# Patient Record
Sex: FEMALE | Race: WHITE | NOT HISPANIC OR LATINO | Employment: STUDENT | URBAN - METROPOLITAN AREA
[De-identification: names, ages, dates, MRNs, and addresses within clinical notes are randomized per-mention and may not be internally consistent; named-entity substitution may affect disease eponyms.]

---
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
## 2023-01-04 DIAGNOSIS — F41.8 DEPRESSION WITH ANXIETY: ICD-10-CM

## 2023-01-04 DIAGNOSIS — R55 VASOVAGAL SYNCOPE: ICD-10-CM

## 2023-01-04 RX ORDER — SERTRALINE HYDROCHLORIDE 25 MG/1
25 TABLET, FILM COATED ORAL DAILY
Qty: 10 TABLET | Refills: 0 | OUTPATIENT
Start: 2023-01-04

## 2023-01-06 ENCOUNTER — OFFICE VISIT (OUTPATIENT)
Dept: FAMILY MEDICINE CLINIC | Facility: CLINIC | Age: 18
End: 2023-01-06
Payer: COMMERCIAL

## 2023-01-06 VITALS
WEIGHT: 124 LBS | OXYGEN SATURATION: 100 % | HEIGHT: 61 IN | TEMPERATURE: 97.8 F | HEART RATE: 85 BPM | BODY MASS INDEX: 23.41 KG/M2

## 2023-01-06 DIAGNOSIS — E55.9 VITAMIN D DEFICIENCY: ICD-10-CM

## 2023-01-06 DIAGNOSIS — K59.00 CONSTIPATION, UNSPECIFIED CONSTIPATION TYPE: ICD-10-CM

## 2023-01-06 DIAGNOSIS — K30 ACID INDIGESTION: ICD-10-CM

## 2023-01-06 DIAGNOSIS — Z11.59 NEED FOR HEPATITIS C SCREENING TEST: ICD-10-CM

## 2023-01-06 DIAGNOSIS — Z23 INFLUENZA VACCINE ADMINISTERED: ICD-10-CM

## 2023-01-06 DIAGNOSIS — D50.9 IRON DEFICIENCY ANEMIA, UNSPECIFIED IRON DEFICIENCY ANEMIA TYPE: ICD-10-CM

## 2023-01-06 DIAGNOSIS — Z00.00 ANNUAL PHYSICAL EXAM: Primary | ICD-10-CM

## 2023-01-06 DIAGNOSIS — E53.8 FOLIC ACID DEFICIENCY: ICD-10-CM

## 2023-01-06 DIAGNOSIS — F41.8 DEPRESSION WITH ANXIETY: ICD-10-CM

## 2023-01-06 DIAGNOSIS — J30.9 ALLERGIC RHINITIS, UNSPECIFIED SEASONALITY, UNSPECIFIED TRIGGER: ICD-10-CM

## 2023-01-06 LAB
25(OH)D3 SERPL-MCNC: 55.4 NG/ML (ref 30–100)
ALBUMIN SERPL-MCNC: 4.3 G/DL (ref 3.5–5.2)
ALBUMIN/GLOB SERPL: 1.2 G/DL
ALP SERPL-CCNC: 59 U/L (ref 39–117)
ALT SERPL W P-5'-P-CCNC: 16 U/L (ref 1–33)
ANION GAP SERPL CALCULATED.3IONS-SCNC: 9.4 MMOL/L (ref 5–15)
AST SERPL-CCNC: 21 U/L (ref 1–32)
BASOPHILS # BLD AUTO: 0.02 10*3/MM3 (ref 0–0.2)
BASOPHILS NFR BLD AUTO: 0.3 % (ref 0–1.5)
BILIRUB SERPL-MCNC: 0.2 MG/DL (ref 0–1.2)
BUN SERPL-MCNC: 13 MG/DL (ref 6–20)
BUN/CREAT SERPL: 18.3 (ref 7–25)
CALCIUM SPEC-SCNC: 9.6 MG/DL (ref 8.6–10.5)
CHLORIDE SERPL-SCNC: 104 MMOL/L (ref 98–107)
CHOLEST SERPL-MCNC: 184 MG/DL (ref 0–200)
CO2 SERPL-SCNC: 26.6 MMOL/L (ref 22–29)
CREAT SERPL-MCNC: 0.71 MG/DL (ref 0.57–1)
DEPRECATED RDW RBC AUTO: 37 FL (ref 37–54)
EGFRCR SERPLBLD CKD-EPI 2021: 125.8 ML/MIN/1.73
EOSINOPHIL # BLD AUTO: 0.11 10*3/MM3 (ref 0–0.4)
EOSINOPHIL NFR BLD AUTO: 1.8 % (ref 0.3–6.2)
ERYTHROCYTE [DISTWIDTH] IN BLOOD BY AUTOMATED COUNT: 12.1 % (ref 12.3–15.4)
FOLATE SERPL-MCNC: >20 NG/ML (ref 4.78–24.2)
GLOBULIN UR ELPH-MCNC: 3.5 GM/DL
GLUCOSE SERPL-MCNC: 73 MG/DL (ref 65–99)
HCT VFR BLD AUTO: 37.9 % (ref 34–46.6)
HCV AB SER DONR QL: NORMAL
HDLC SERPL-MCNC: 72 MG/DL (ref 40–60)
HGB BLD-MCNC: 12.5 G/DL (ref 12–15.9)
IMM GRANULOCYTES # BLD AUTO: 0.03 10*3/MM3 (ref 0–0.05)
IMM GRANULOCYTES NFR BLD AUTO: 0.5 % (ref 0–0.5)
IRON 24H UR-MRATE: 40 MCG/DL (ref 37–145)
IRON SATN MFR SERPL: 8 % (ref 20–50)
LDLC SERPL CALC-MCNC: 100 MG/DL (ref 0–100)
LDLC/HDLC SERPL: 1.38 {RATIO}
LYMPHOCYTES # BLD AUTO: 1.39 10*3/MM3 (ref 0.7–3.1)
LYMPHOCYTES NFR BLD AUTO: 22.5 % (ref 19.6–45.3)
MCH RBC QN AUTO: 28.2 PG (ref 26.6–33)
MCHC RBC AUTO-ENTMCNC: 33 G/DL (ref 31.5–35.7)
MCV RBC AUTO: 85.6 FL (ref 79–97)
MONOCYTES # BLD AUTO: 0.6 10*3/MM3 (ref 0.1–0.9)
MONOCYTES NFR BLD AUTO: 9.7 % (ref 5–12)
NEUTROPHILS NFR BLD AUTO: 4.02 10*3/MM3 (ref 1.7–7)
NEUTROPHILS NFR BLD AUTO: 65.2 % (ref 42.7–76)
NRBC BLD AUTO-RTO: 0 /100 WBC (ref 0–0.2)
PLATELET # BLD AUTO: 278 10*3/MM3 (ref 140–450)
PMV BLD AUTO: 11 FL (ref 6–12)
POTASSIUM SERPL-SCNC: 4.7 MMOL/L (ref 3.5–5.2)
PROT SERPL-MCNC: 7.8 G/DL (ref 6–8.5)
RBC # BLD AUTO: 4.43 10*6/MM3 (ref 3.77–5.28)
SODIUM SERPL-SCNC: 140 MMOL/L (ref 136–145)
TIBC SERPL-MCNC: 532 MCG/DL (ref 298–536)
TRANSFERRIN SERPL-MCNC: 357 MG/DL (ref 200–360)
TRIGL SERPL-MCNC: 65 MG/DL (ref 0–150)
TSH SERPL DL<=0.05 MIU/L-ACNC: 0.83 UIU/ML (ref 0.27–4.2)
VIT B12 BLD-MCNC: >2000 PG/ML (ref 211–946)
VLDLC SERPL-MCNC: 12 MG/DL (ref 5–40)
WBC NRBC COR # BLD: 6.17 10*3/MM3 (ref 3.4–10.8)

## 2023-01-06 PROCEDURE — 82746 ASSAY OF FOLIC ACID SERUM: CPT

## 2023-01-06 PROCEDURE — 86803 HEPATITIS C AB TEST: CPT

## 2023-01-06 PROCEDURE — 82306 VITAMIN D 25 HYDROXY: CPT

## 2023-01-06 PROCEDURE — 3008F BODY MASS INDEX DOCD: CPT

## 2023-01-06 PROCEDURE — 82607 VITAMIN B-12: CPT

## 2023-01-06 PROCEDURE — 2014F MENTAL STATUS ASSESS: CPT

## 2023-01-06 PROCEDURE — 90686 IIV4 VACC NO PRSV 0.5 ML IM: CPT

## 2023-01-06 PROCEDURE — 84443 ASSAY THYROID STIM HORMONE: CPT

## 2023-01-06 PROCEDURE — 85025 COMPLETE CBC W/AUTO DIFF WBC: CPT

## 2023-01-06 PROCEDURE — 84466 ASSAY OF TRANSFERRIN: CPT

## 2023-01-06 PROCEDURE — 80061 LIPID PANEL: CPT

## 2023-01-06 PROCEDURE — 90471 IMMUNIZATION ADMIN: CPT

## 2023-01-06 PROCEDURE — 99395 PREV VISIT EST AGE 18-39: CPT

## 2023-01-06 PROCEDURE — 80053 COMPREHEN METABOLIC PANEL: CPT

## 2023-01-06 PROCEDURE — 83540 ASSAY OF IRON: CPT

## 2023-01-06 PROCEDURE — 36415 COLL VENOUS BLD VENIPUNCTURE: CPT

## 2023-01-06 RX ORDER — SERTRALINE HYDROCHLORIDE 25 MG/1
25 TABLET, FILM COATED ORAL DAILY
Qty: 90 TABLET | Refills: 1 | Status: SHIPPED | OUTPATIENT
Start: 2023-01-06

## 2023-01-06 RX ORDER — UBIDECARENONE 75 MG
50 CAPSULE ORAL DAILY
COMMUNITY

## 2023-01-06 RX ORDER — CETIRIZINE HYDROCHLORIDE 10 MG/1
10 TABLET ORAL DAILY
Qty: 90 TABLET | Refills: 1 | Status: SHIPPED | OUTPATIENT
Start: 2023-01-06

## 2023-01-06 RX ORDER — BISACODYL 5 MG
5 TABLET, DELAYED RELEASE (ENTERIC COATED) ORAL DAILY PRN
Qty: 30 TABLET | Refills: 5 | Status: SHIPPED | OUTPATIENT
Start: 2023-01-06

## 2023-01-06 RX ORDER — FAMOTIDINE 40 MG/1
40 TABLET, FILM COATED ORAL NIGHTLY PRN
Qty: 90 TABLET | Refills: 0 | Status: SHIPPED | OUTPATIENT
Start: 2023-01-06

## 2023-01-06 RX ORDER — CHOLECALCIFEROL (VITAMIN D3) 125 MCG
1 CAPSULE ORAL DAILY
Qty: 30 CAPSULE | Refills: 5 | Status: SHIPPED | OUTPATIENT
Start: 2023-01-06

## 2023-01-06 NOTE — PROGRESS NOTES
Venipuncture Blood Specimen Collection  Venipuncture performed in left arm by Alab Pruett with good hemostasis. Patient tolerated the procedure well without complications.   01/06/23   Alba Pruett

## 2023-01-06 NOTE — PROGRESS NOTES
Chief Complaint  Depression (Refills, labs, and a flu shot)    Subjective          Jenifer Yeboah presents to [facility]  History of Present Illness    Jenifer is here for refills, labs and would like to get her flu shot.     She is needing refills of her depression medication - zoloft.  She is also requesting refills of her other medication.    She denies any new concerns or issues.    She was seen in the ER a few weeks ago for abdominal pain which they said was related to constipation.  She reports she takes Colace daily to help with this.  She denies seeing a GI doctor.    Objective   Vital Signs:   Pulse 85   Temp 97.8 °F (36.6 °C)   Ht 154.9 cm (61\")   Wt 56.2 kg (124 lb)   SpO2 100%   BMI 23.43 kg/m²     Physical Exam  Vitals reviewed.   Constitutional:       Appearance: Normal appearance. She is well-developed.   HENT:      Head: Normocephalic and atraumatic.      Mouth/Throat:      Pharynx: No oropharyngeal exudate.   Eyes:      Conjunctiva/sclera: Conjunctivae normal.      Pupils: Pupils are equal, round, and reactive to light.   Cardiovascular:      Rate and Rhythm: Normal rate and regular rhythm.      Heart sounds: No murmur heard.    No friction rub. No gallop.   Pulmonary:      Effort: Pulmonary effort is normal.      Breath sounds: Normal breath sounds. No wheezing or rhonchi.   Skin:     General: Skin is warm and dry.   Neurological:      Mental Status: She is alert and oriented to person, place, and time.   Psychiatric:         Mood and Affect: Affect normal.        Result Review :     CMP    CMP 12/16/22   Glucose 85   BUN 12   Creatinine 0.78   eGFR 112.4   Sodium 140   Potassium 3.8   Chloride 101   Calcium 9.5   Total Protein 7.3   Albumin 4.30   Globulin 3.0   Total Bilirubin <0.2   Alkaline Phosphatase 63   AST (SGOT) 16   ALT (SGPT) 13   Albumin/Globulin Ratio 1.4   BUN/Creatinine Ratio 15.4   Anion Gap 10.8      Comments are available for some flowsheets  but are not being displayed.           CBC    CBC 12/16/22   WBC 6.67   RBC 4.33   Hemoglobin 12.2   Hematocrit 36.2   MCV 83.6   MCH 28.2   MCHC 33.7   RDW 12.1 (A)   Platelets 307   (A) Abnormal value                           Procedures      Assessment and Plan    Diagnoses and all orders for this visit:    1. Annual physical exam (Primary)  Assessment & Plan:  Screening labs reviewed/ordered. Counseling provided regarding age appropriate screenings and immunizations, healthy diet and exercise.     Orders:  -     Comprehensive Metabolic Panel  -     CBC & Differential  -     TSH  -     Lipid Panel    2. Influenza vaccine administered  Comments:  Tolerated well.  Orders:  -     FluLaval/Fluzone >6 mos ([id number])    3. Depression with anxiety  Comments:  Patient doing well on current dose of Zoloft.  Denies SI/HI.  Denies any concerns.  Refill sent in.  Orders:  -     sertraline (ZOLOFT) 25 MG tablet; Take 1 tablet by mouth Daily.  Dispense: 90 tablet; Refill: 1    4. Vitamin D deficiency  Comments:  We will check labs and refill medication as needed based on results.  Orders:  -     Vitamin D 25 hydroxy    5. Iron deficiency anemia, unspecified iron deficiency anemia type  Comments:  History of DAYANA.  Patient not currently on iron supplementation.  Orders:  -     Iron Profile    6. Folic acid deficiency  Comments:  Will check folate levels.  Refill will be sent in based on results.  Orders:  -     Vitamin B12 & Folate    7. Allergic rhinitis, unspecified seasonality, unspecified trigger  Comments:  Refill sent in of daily allergy medicine.  Patient reports symptoms are controlled with medication.  Orders:  -     cetirizine (zyrTEC) 10 MG tablet; Take 1 tablet by mouth Daily.  Dispense: 90 tablet; Refill: 1    8. Acid indigestion  Comments:  Patient reports symptoms are controlled with currently prescribed Pepcid.  Refill sent in per patient request.  Orders:  -     famotidine (PEPCID) 40 MG tablet; Take 1 tablet  by mouth At Night As Needed for Heartburn.  Dispense: 90 tablet; Refill: 0    9. Constipation, unspecified constipation type  Comments:  Patient takes Colace intermittently to help with constipation.  Discussed possible GI referral if symptoms persist.  Patient verbalized understanding.  Orders:  -     Lactobacillus (Probiotic Acidophilus) capsule; Take 1 each by mouth Daily.  Dispense: 30 capsule; Refill: 5  -     bisacodyl (Dulcolax) 5 MG EC tablet; Take 1 tablet by mouth Daily As Needed for Constipation.  Dispense: 30 tablet; Refill: 5    10. Need for hepatitis C screening test  -     Hepatitis C Antibody    Patient was instructed and educated on their diagnoses and treatment plan prior to leaving the office. If symptoms worsen or persist, seek emergency medical attention. Take all medications as prescribed. Call the office if any questions or concerns arise.       19 to 39: Counseling/Anticipatory Guidance Discussed: nutrition, physical activity, injury prevention, misuse of tobacco, sexual behavior and STDs, dental health, mental health, immunizations and breast cancer and self breast exams      Follow Up   Return if symptoms worsen or fail to improve.  Patient was given instructions and counseling regarding her condition or for health maintenance advice. Please see specific information pulled into the AVS if appropriate.

## 2023-01-06 NOTE — ASSESSMENT & PLAN NOTE
Screening labs reviewed/ordered. Counseling provided regarding age appropriate screenings and immunizations, healthy diet and exercise.

## 2023-01-18 DIAGNOSIS — E53.8 FOLIC ACID DEFICIENCY: ICD-10-CM

## 2023-01-18 RX ORDER — FOLIC ACID 1 MG/1
1 TABLET ORAL DAILY
Qty: 30 TABLET | Refills: 0 | Status: SHIPPED | OUTPATIENT
Start: 2023-01-18 | End: 2023-03-30

## 2023-02-17 DIAGNOSIS — E53.8 FOLIC ACID DEFICIENCY: ICD-10-CM

## 2023-02-17 RX ORDER — FOLIC ACID 1 MG/1
1 TABLET ORAL DAILY
Qty: 30 TABLET | Refills: 0 | OUTPATIENT
Start: 2023-02-17

## 2023-03-14 DIAGNOSIS — E55.9 VITAMIN D DEFICIENCY: ICD-10-CM

## 2023-03-15 RX ORDER — CHOLECALCIFEROL (VITAMIN D3) 125 MCG
CAPSULE ORAL
Qty: 30 TABLET | Refills: 2 | Status: SHIPPED | OUTPATIENT
Start: 2023-03-15

## 2023-03-19 DIAGNOSIS — K59.00 CONSTIPATION, UNSPECIFIED CONSTIPATION TYPE: ICD-10-CM

## 2023-03-20 RX ORDER — DOCUSATE SODIUM 100 MG/1
CAPSULE, LIQUID FILLED ORAL
Qty: 30 CAPSULE | Refills: 5 | OUTPATIENT
Start: 2023-03-20

## 2023-03-21 ENCOUNTER — TELEPHONE (OUTPATIENT)
Dept: FAMILY MEDICINE CLINIC | Facility: CLINIC | Age: 18
End: 2023-03-21

## 2023-03-21 NOTE — TELEPHONE ENCOUNTER
Caller: DB TEJEDA  VERBAL ON FILE    Relationship: Emergency Contact    Best call back number: [phone]    Who are you requesting to speak with (clinical staff, provider,  specific staff member): MEDICAL STAFF    What was the call regarding: PATIENTS GRANDFATHER WAS TOLD BY PHARMACY THAT THE DOCUSATE REFILL REQUEST WAS DENIED. HE WOULD LIKE TO KNOW WHY THE REFILL IS DENIED SINCE SHE HAS A SCHEDULED 6 MONTH FOLLOW UP APPOINTMENT. SHE HAS ABOUT 2 WEEKS WORTH LEFT. PLEASE CALL TO ADVISE.   Speech Therapy Daily Treatment    Visit Count: 2/20  Plan of Care Dates: Initial: 5/31/2018 Through: 9/6/2018  Insurance Information: THERAPY BENEFITS:  PAYOR: HUMANA  VISIT LIMIT: 60 VISITS PER YEAR - COMBINED PT/OT/ST  AUTHORIZATION NEEDED: NO        DEDUCTIBLE: $400.00               MET: $YES  OUT OF POCKET: $5000.00      MET: $479.15  COINSURANCE: 0  COPAY: $35.00 PER DAY  REF #: ONLINE  THIS QUOTE OF BENEFITS IS NOT A GUARANTEE OF PAYMENT     Next Referring Provider Visit: na     Referred by: Dr. Chester Draper MD  Medical Diagnosis (from order  Acute ischemic left MCA stroke (CMS/[facility]) [I63.512]       Aphasia [R47.01]             Treatment Diagnosis: Cognitive Communication Deficit  Aphasia  Disorder of Written Expression  Expressive Language Disorder  Aphasia Following Cerebral Infarction  Cognitive Deficits Following Cerebral Infarction  Dysarthria Following Cerebral Infarction  Insurance: 1. HUMANA  2. N/A     Date of Onset/Injury: 4/26/18 hospitalized  Precautions: Aphasia  Relevant History/Medications: See Neuro Rehabilitation Intake Form  Relevant Tests: CT scan     Medical/surgical history, medications and relevant tests have been reviewed.         SUBJECTIVE:    Pt reports having difficulty reading numbers and remembering numbers (ie address and phone number)  Pain: patient reports pain is not an issue/concern    OBJECTIVE:        Treatment   Auditory Comprehension:    Verbal Expression:Skilled instruction in word finding strategies.  Provided For Your WellBeing Sheet.  Skilled instruction and practice in strategy of \"more or less in degree\".  Pt able to demonstrate in the clinic. Home practice provided.  Speed drill--name object described within 5 seconds, 16/20, 80%.    Reading Comprehension: Sequencing Informational Statements exercise--reading 2 sentence paragraph and sequencing 3 events, 3/5 correct, 60% accuracy. Instruction in strategies to reading aloud and underlining important information.   Home practice provided.    Graphic Expression: Writing 3 steps for a given activity.  Pt encouraged to verbalize step first, then write.  Generally would use 2-3 word phrase--Min cues needed to expand information.  Micrographia noted--pt provided with lined paper--improved legibility with instruction to hit top and bottom lines.    Executive Function:    Numerical Reasoning:    Motor Speech: Skilled instruction in articulation exercises (pa; la; ka; willy; and buttercup)--skilled instruction in  lingual movement from jaw.  Home practice provided.    Current Home Program (not performed this date except as noted above):  Exercises: Aphasia Exercises    ASSESSMENT:    61 year old female presented initially to therapy with significant decline in prior level of function and is below functional baseline due to mild-moderate expressive language deficits, as well as reading/writing deficts,   following Cerebrovascular Accident (CVA).        Today's Treatment: Skilled instruction, facilitation/stimulation exercises and compensatory strategies were focus of session for verbal expression--word finding strategies.  Reading comprehension--skilled instruction/practice in comprehension strategies to highlight important information.  Graphic expression requiring cues to increase description/sentence length.      Result of above outlined education: Verbalizes understanding    Goals:   To be obtained by end of this plan of care:  1. Patient will comprehend complex auditory stimuli for IADL's at 90% accuracy to maintain safety and participate socially in functional living environment.  2. Patient will demonstrate complex language/thought processing skills such as problem solving, reasoning, and higher level executive functioning in order to improve safety and awareness in functional living environment at 80% accuracy with min cues.  3. Patient will improve numerical reasoning and financial management at independent level at  90% accuracy with min cues.  4. Patient will develop functional reading skills at multi-paragraph level and utilize compensatory strategies to maintain safety during IADL's and read and understand functional material at 90% accuracy with min cues.  5. Patient will develop verbal self expression and utilize compensatory strategies to communicate wants and needs effectively to different conversational partners, maintain safety, and participate socially in functional living environment at 80% accuracy with min cues.  6. Patient will improve functional graphic language skills to communicate daily needs/activities at 90%.  7. Pt will demonstrate improved speech intelligibility to 95% for 5 minute conversation using dysarthria reduction techniques and improved coordination/strength of oral articulators,.       PLAN:    Next session: check homework; Complex auditory comprehension; verbal expression--word finding(synonyms; speed drills); paragraph reading, picture;  descrpition (verbal and writing)--sequences; numeric reasoning; problem solving; motor speech (check progress on articulation exercises)    THERAPY DAILY BILLING:    Primary Insurance: HUMANA  Secondary Insurance: N/A    Evaluation Procedures:  No evaluation codes were used on this date of service    Treatment Procedures:  Treatment of Speech Language    Total Treatment Time: 55 minutes      The referring provider's electronic or written signature on the evaluation authorizes the therapy plan of care and certifies the need for these services, furnished under this plan of care while under their care.  Physician Signature on file.

## 2023-03-29 DIAGNOSIS — E53.8 FOLIC ACID DEFICIENCY: ICD-10-CM

## 2023-03-29 DIAGNOSIS — K59.00 CONSTIPATION, UNSPECIFIED CONSTIPATION TYPE: ICD-10-CM

## 2023-03-30 RX ORDER — FOLIC ACID 1 MG/1
1 TABLET ORAL DAILY
Qty: 30 TABLET | Refills: 0 | Status: SHIPPED | OUTPATIENT
Start: 2023-03-30

## 2023-03-30 RX ORDER — DOCUSATE SODIUM 100 MG/1
CAPSULE, LIQUID FILLED ORAL
Qty: 30 CAPSULE | Refills: 0 | Status: SHIPPED | OUTPATIENT
Start: 2023-03-30

## 2023-04-27 DIAGNOSIS — E53.8 FOLIC ACID DEFICIENCY: ICD-10-CM

## 2023-04-28 DIAGNOSIS — K59.00 CONSTIPATION, UNSPECIFIED CONSTIPATION TYPE: ICD-10-CM

## 2023-04-28 RX ORDER — DOCUSATE SODIUM 100 MG/1
CAPSULE, LIQUID FILLED ORAL
Qty: 30 CAPSULE | Refills: 0 | Status: SHIPPED | OUTPATIENT
Start: 2023-04-28

## 2023-04-28 RX ORDER — FOLIC ACID 1 MG/1
1 TABLET ORAL DAILY
Qty: 30 TABLET | Refills: 0 | Status: SHIPPED | OUTPATIENT
Start: 2023-04-28

## 2023-05-23 ENCOUNTER — OFFICE VISIT (OUTPATIENT)
Dept: FAMILY MEDICINE CLINIC | Facility: CLINIC | Age: 18
End: 2023-05-23
Payer: COMMERCIAL

## 2023-05-23 VITALS
WEIGHT: 122.6 LBS | TEMPERATURE: 97.6 F | HEIGHT: 62 IN | HEART RATE: 130 BPM | SYSTOLIC BLOOD PRESSURE: 110 MMHG | OXYGEN SATURATION: 97 % | DIASTOLIC BLOOD PRESSURE: 60 MMHG | BODY MASS INDEX: 22.56 KG/M2

## 2023-05-23 DIAGNOSIS — R11.2 NAUSEA AND VOMITING, UNSPECIFIED VOMITING TYPE: ICD-10-CM

## 2023-05-23 DIAGNOSIS — Z72.0 TOBACCO ABUSE: ICD-10-CM

## 2023-05-23 DIAGNOSIS — F41.9 ANXIETY: Primary | ICD-10-CM

## 2023-05-23 DIAGNOSIS — F41.8 DEPRESSION WITH ANXIETY: ICD-10-CM

## 2023-05-23 LAB
ALBUMIN SERPL-MCNC: 4.4 G/DL (ref 3.5–5.2)
ALBUMIN/GLOB SERPL: 1.3 G/DL
ALP SERPL-CCNC: 65 U/L (ref 39–117)
ALT SERPL W P-5'-P-CCNC: 12 U/L (ref 1–33)
ANION GAP SERPL CALCULATED.3IONS-SCNC: 18.7 MMOL/L (ref 5–15)
AST SERPL-CCNC: 19 U/L (ref 1–32)
BASOPHILS # BLD AUTO: 0.02 10*3/MM3 (ref 0–0.2)
BASOPHILS NFR BLD AUTO: 0.2 % (ref 0–1.5)
BILIRUB SERPL-MCNC: 0.3 MG/DL (ref 0–1.2)
BUN SERPL-MCNC: 12 MG/DL (ref 6–20)
BUN/CREAT SERPL: 16 (ref 7–25)
CALCIUM SPEC-SCNC: 9.8 MG/DL (ref 8.6–10.5)
CHLORIDE SERPL-SCNC: 101 MMOL/L (ref 98–107)
CO2 SERPL-SCNC: 20.3 MMOL/L (ref 22–29)
CREAT SERPL-MCNC: 0.75 MG/DL (ref 0.57–1)
DEPRECATED RDW RBC AUTO: 39 FL (ref 37–54)
EGFRCR SERPLBLD CKD-EPI 2021: 117.1 ML/MIN/1.73
EOSINOPHIL # BLD AUTO: 0 10*3/MM3 (ref 0–0.4)
EOSINOPHIL NFR BLD AUTO: 0 % (ref 0.3–6.2)
ERYTHROCYTE [DISTWIDTH] IN BLOOD BY AUTOMATED COUNT: 13.5 % (ref 12.3–15.4)
GLOBULIN UR ELPH-MCNC: 3.5 GM/DL
GLUCOSE SERPL-MCNC: 63 MG/DL (ref 65–99)
HCT VFR BLD AUTO: 39.4 % (ref 34–46.6)
HGB BLD-MCNC: 12.8 G/DL (ref 12–15.9)
IMM GRANULOCYTES # BLD AUTO: 0.04 10*3/MM3 (ref 0–0.05)
IMM GRANULOCYTES NFR BLD AUTO: 0.4 % (ref 0–0.5)
LYMPHOCYTES # BLD AUTO: 0.68 10*3/MM3 (ref 0.7–3.1)
LYMPHOCYTES NFR BLD AUTO: 6.7 % (ref 19.6–45.3)
MAGNESIUM SERPL-MCNC: 2 MG/DL (ref 1.7–2.2)
MCH RBC QN AUTO: 26.3 PG (ref 26.6–33)
MCHC RBC AUTO-ENTMCNC: 32.5 G/DL (ref 31.5–35.7)
MCV RBC AUTO: 80.9 FL (ref 79–97)
MONOCYTES # BLD AUTO: 0.39 10*3/MM3 (ref 0.1–0.9)
MONOCYTES NFR BLD AUTO: 3.9 % (ref 5–12)
NEUTROPHILS NFR BLD AUTO: 8.97 10*3/MM3 (ref 1.7–7)
NEUTROPHILS NFR BLD AUTO: 88.8 % (ref 42.7–76)
NRBC BLD AUTO-RTO: 0 /100 WBC (ref 0–0.2)
PLATELET # BLD AUTO: 308 10*3/MM3 (ref 140–450)
PMV BLD AUTO: 11.4 FL (ref 6–12)
POTASSIUM SERPL-SCNC: 3.8 MMOL/L (ref 3.5–5.2)
PROT SERPL-MCNC: 7.9 G/DL (ref 6–8.5)
RBC # BLD AUTO: 4.87 10*6/MM3 (ref 3.77–5.28)
SODIUM SERPL-SCNC: 140 MMOL/L (ref 136–145)
T4 FREE SERPL-MCNC: 1.7 NG/DL (ref 0.93–1.7)
TSH SERPL DL<=0.05 MIU/L-ACNC: 0.31 UIU/ML (ref 0.27–4.2)
WBC NRBC COR # BLD: 10.1 10*3/MM3 (ref 3.4–10.8)

## 2023-05-23 PROCEDURE — 84439 ASSAY OF FREE THYROXINE: CPT | Performed by: FAMILY MEDICINE

## 2023-05-23 PROCEDURE — 36415 COLL VENOUS BLD VENIPUNCTURE: CPT | Performed by: FAMILY MEDICINE

## 2023-05-23 PROCEDURE — 84443 ASSAY THYROID STIM HORMONE: CPT | Performed by: FAMILY MEDICINE

## 2023-05-23 PROCEDURE — 85025 COMPLETE CBC W/AUTO DIFF WBC: CPT | Performed by: FAMILY MEDICINE

## 2023-05-23 PROCEDURE — 1159F MED LIST DOCD IN RCRD: CPT | Performed by: FAMILY MEDICINE

## 2023-05-23 PROCEDURE — 99214 OFFICE O/P EST MOD 30 MIN: CPT | Performed by: FAMILY MEDICINE

## 2023-05-23 PROCEDURE — 1160F RVW MEDS BY RX/DR IN RCRD: CPT | Performed by: FAMILY MEDICINE

## 2023-05-23 PROCEDURE — 83735 ASSAY OF MAGNESIUM: CPT | Performed by: FAMILY MEDICINE

## 2023-05-23 PROCEDURE — 80053 COMPREHEN METABOLIC PANEL: CPT | Performed by: FAMILY MEDICINE

## 2023-05-23 RX ORDER — BUSPIRONE HYDROCHLORIDE 5 MG/1
5 TABLET ORAL 3 TIMES DAILY
Qty: 90 TABLET | Refills: 1 | Status: SHIPPED | OUTPATIENT
Start: 2023-05-23

## 2023-05-23 RX ORDER — ONDANSETRON 4 MG/1
4 TABLET, FILM COATED ORAL 4 TIMES DAILY PRN
Qty: 30 TABLET | Refills: 1 | Status: SHIPPED | OUTPATIENT
Start: 2023-05-23

## 2023-05-23 RX ORDER — NICOTINE 21 MG/24HR
1 PATCH, TRANSDERMAL 24 HOURS TRANSDERMAL EVERY 24 HOURS
Qty: 30 PATCH | Refills: 0 | Status: SHIPPED | OUTPATIENT
Start: 2023-05-23

## 2023-05-23 NOTE — PROGRESS NOTES
Venipuncture Blood Specimen Collection  Venipuncture performed in left arm by Alba Pruett with good hemostasis. Patient tolerated the procedure well without complications.   05/23/23   Alba Pruett

## 2023-05-23 NOTE — PROGRESS NOTES
Chief Complaint  Vomiting and Anxiety (Anxiety and emesis x 8 days since quitting e ciggerates/vaping )    Subjective          Jenifer Yeboah presents to [facility]  History of Present Illness  Pt has been vaping x 3 yrs- pt quit last week and has had anxiety, nausea and vomiting since then- pt is on zoloft 25mg for anxiety  Pt says no chance of pregnancy  Pt has no SI or HI- pt feels safe  Anxiety  Presents for follow-up visit. Symptoms include depressed mood, excessive worry, insomnia, irritability, nausea, nervous/anxious behavior and panic. Patient reports no chest pain, compulsions, confusion, decreased concentration, dizziness, dry mouth, feeling of choking, hyperventilation, malaise, muscle tension, obsessions, palpitations, restlessness, shortness of breath or suicidal ideas. Symptoms occur constantly. The severity of symptoms is severe. The quality of sleep is fair. Nighttime awakenings: occasional.     Compliance with medications is %. Treatment side effects: no side effects.       Objective   Allergies   Allergen Reactions   • Sulfamethoxazole-Trimethoprim Hives   • Naproxen Confusion     Started studerring     Immunization History   Administered Date(s) Administered   • COVID-19 (MODERNA) 1st,2nd,3rd Dose Monovalent 08/06/2021, 08/31/2021   • DTaP 2003, 2003, 2003, 01/04/2005, 04/04/2007   • DTaP, Unspecified 2003, 2003, 2003, 01/04/2005, 04/04/2007   • Flu Vaccine Quad PF >36MO 10/18/2018, 10/15/2019, 10/19/2020   • Flu Vaccine Split Quad 10/18/2018, 10/15/2019, 10/19/2020   • FluLaval/Fluzone >6mos 10/20/2021, 01/06/2023   • FluMist 2-49yrs 11/18/2014, 10/06/2015   • HPV Quadrivalent 08/04/2015   • Hep A, 2 Dose 01/29/2014, 08/04/2015   • Hep B, Adolescent or Pediatric 2003, 2003, 2003   • Hep B, Unspecified 2003, 2003, 2003   • HiB 2003, 2003, 2003, 01/04/2005   • Hib  (PRP-OMP) 2003, 2003, 2003, 01/04/2005   • Hpv9 10/06/2015, 04/06/2016   • IPV 2003, 2003, 2003, 04/04/2007   • Influenza LAIV (Nasal) 10/09/2009, 01/14/2011, 10/10/2011, 12/10/2012, 10/12/2013   • MMR 02/23/2004, 04/04/2007   • Meningococcal MCV4P (Menactra) 01/29/2014, 01/29/2014, 02/19/2019   • PEDS-Pneumococcal Conjugate (PCV7) 2003, 2003, 2003, 01/04/2005   • Pneumococcal, Unspecified 2003, 2003, 2003, 01/04/2005   • Polio, Unspecified 2003, 2003, 2003, 04/04/2007   • Tdap 01/29/2014   • Varicella 02/23/2004, 04/04/2007     Past Medical History:   Diagnosis Date   • Allergic    • Anxiety    • Asthma    • Depression    • GERD (gastroesophageal reflux disease)       History reviewed. No pertinent surgical history.   Social History     Socioeconomic History   • Marital status: Single   Tobacco Use   • Smoking status: Never   • Smokeless tobacco: Never   Vaping Use   • Vaping Use: Former   • Substances: Nicotine, Flavoring   • Devices: Disposable   Substance and Sexual Activity   • Alcohol use: Never   • Drug use: Never   • Sexual activity: Defer     Birth control/protection: None        Current Outpatient Medications:   •  APAP-Pamabrom-Pyrilamine (PAMPRIN MULTI-SYMPTOM PO), Take  by mouth., Disp: , Rfl:   •  bisacodyl (Dulcolax) 5 MG EC tablet, Take 1 tablet by mouth Daily As Needed for Constipation., Disp: 30 tablet, Rfl: 5  •  cetirizine (zyrTEC) 10 MG tablet, Take 1 tablet by mouth Daily., Disp: 90 tablet, Rfl: 1  •  Cholecalciferol (Vitamin D3) 50 MCG (2000 UT) tablet, TAKE 1 TABLET BY MOUTH DAILY, Disp: 30 tablet, Rfl: 2  •  docusate sodium (COLACE) 100 MG capsule, TAKE 1 CAPSULE BY MOUTH EVERY DAY, Disp: 30 capsule, Rfl: 0  •  famotidine (PEPCID) 40 MG tablet, Take 1 tablet by mouth At Night As Needed for Heartburn., Disp: 90 tablet, Rfl: 0  •  folic acid (FOLVITE) 1 MG tablet, TAKE 1 TABLET BY MOUTH DAILY, Disp: 30  "tablet, Rfl: 0  •  Multiple Vitamins-Minerals ([facility] DAILY GUMMIES PO), Take  by mouth. Gummy b 12, Disp: , Rfl:   •  sertraline (ZOLOFT) 50 MG tablet, Take 1 tablet by mouth Daily., Disp: 30 tablet, Rfl: 1  •  vitamin B-12 (CYANOCOBALAMIN) 100 MCG tablet, Take 50 mcg by mouth Daily., Disp: , Rfl:   •  busPIRone (BUSPAR) 5 MG tablet, Take 1 tablet by mouth 3 (Three) Times a Day., Disp: 90 tablet, Rfl: 1  •  nicotine (Nicoderm CQ) 14 MG/24HR patch, Place 1 patch on the skin as directed by provider Daily., Disp: 30 patch, Rfl: 0  •  ondansetron (Zofran) 4 MG tablet, Take 1 tablet by mouth 4 (Four) Times a Day As Needed for Nausea or Vomiting., Disp: 30 tablet, Rfl: 1   Family History   Problem Relation Age of Onset   • No Known Problems Father    • No Known Problems Mother    • Heart disease Sister    • Ovarian cancer Maternal Grandmother           Vital Signs:   Vitals:    05/23/23 0854   BP: 110/60   BP Location: Right arm   Patient Position: Sitting   Cuff Size: Adult   Pulse: (!) 130   Temp: 97.6 °F (36.4 °C)   SpO2: 97%   Weight: 55.6 kg (122 lb 9.6 oz)   Height: 157.5 cm (62\")       Review of Systems   Constitutional: Positive for irritability.   Respiratory: Negative for shortness of breath.    Cardiovascular: Negative for chest pain and palpitations.   Gastrointestinal: Positive for nausea.   Neurological: Negative for dizziness.   Psychiatric/Behavioral: Negative for confusion, decreased concentration and suicidal ideas. The patient is nervous/anxious and has insomnia.       Physical Exam  Vitals reviewed.   Constitutional:       Appearance: Normal appearance. She is well-developed.   HENT:      Head: Normocephalic and atraumatic.      Right Ear: External ear normal.      Left Ear: External ear normal.      Mouth/Throat:      Pharynx: No oropharyngeal exudate.   Eyes:      Conjunctiva/sclera: Conjunctivae normal.      Pupils: Pupils are equal, round, and reactive to light.   Cardiovascular:      Rate and " Rhythm: Normal rate and regular rhythm.      Pulses: Normal pulses.      Heart sounds: Normal heart sounds. No murmur heard.    No friction rub. No gallop.   Pulmonary:      Effort: Pulmonary effort is normal.      Breath sounds: Normal breath sounds. No wheezing or rhonchi.   Abdominal:      General: Abdomen is flat. Bowel sounds are normal. There is no distension.      Palpations: Abdomen is soft. There is no mass.      Tenderness: There is no abdominal tenderness. There is no guarding or rebound.      Hernia: No hernia is present.   Musculoskeletal:         General: Normal range of motion.   Skin:     General: Skin is warm and dry.      Capillary Refill: Capillary refill takes less than 2 seconds.   Neurological:      General: No focal deficit present.      Mental Status: She is alert and oriented to person, place, and time.      Cranial Nerves: No cranial nerve deficit.   Psychiatric:         Mood and Affect: Mood and affect normal.         Behavior: Behavior normal.         Thought Content: Thought content normal.         Judgment: Judgment normal.        Result Review :                 Assessment and Plan    Diagnoses and all orders for this visit:    1. Anxiety (Primary)  -     busPIRone (BUSPAR) 5 MG tablet; Take 1 tablet by mouth 3 (Three) Times a Day.  Dispense: 90 tablet; Refill: 1    2. Depression with anxiety  Comments:  Patient doing well on current dose of Zoloft.  Denies SI/HI.  Denies any concerns.  Refill sent in.  Orders:  -     sertraline (ZOLOFT) 50 MG tablet; Take 1 tablet by mouth Daily.  Dispense: 30 tablet; Refill: 1  -     TSH+Free T4    3. Tobacco abuse  -     nicotine (Nicoderm CQ) 14 MG/24HR patch; Place 1 patch on the skin as directed by provider Daily.  Dispense: 30 patch; Refill: 0    4. Nausea and vomiting, unspecified vomiting type  -     ondansetron (Zofran) 4 MG tablet; Take 1 tablet by mouth 4 (Four) Times a Day As Needed for Nausea or Vomiting.  Dispense: 30 tablet; Refill: 1  -      CBC Auto Differential  -     Comprehensive Metabolic Panel  -     Magnesium            Follow Up   Return in about 2 weeks (around 6/6/2023) for Recheck.  Patient was given instructions and counseling regarding her condition or for health maintenance advice. Please see specific information pulled into the AVS if appropriate.

## 2023-05-28 DIAGNOSIS — E53.8 FOLIC ACID DEFICIENCY: ICD-10-CM

## 2023-05-28 DIAGNOSIS — K59.00 CONSTIPATION, UNSPECIFIED CONSTIPATION TYPE: ICD-10-CM

## 2023-05-30 RX ORDER — FOLIC ACID 1 MG/1
1 TABLET ORAL DAILY
Qty: 30 TABLET | Refills: 0 | Status: SHIPPED | OUTPATIENT
Start: 2023-05-30

## 2023-05-30 RX ORDER — DOCUSATE SODIUM 100 MG/1
CAPSULE, LIQUID FILLED ORAL
Qty: 30 CAPSULE | Refills: 0 | Status: SHIPPED | OUTPATIENT
Start: 2023-05-30

## 2023-06-09 ENCOUNTER — OFFICE VISIT (OUTPATIENT)
Dept: FAMILY MEDICINE CLINIC | Facility: CLINIC | Age: 18
End: 2023-06-09
Payer: COMMERCIAL

## 2023-06-09 VITALS
OXYGEN SATURATION: 98 % | SYSTOLIC BLOOD PRESSURE: 122 MMHG | DIASTOLIC BLOOD PRESSURE: 80 MMHG | WEIGHT: 123 LBS | HEIGHT: 62 IN | BODY MASS INDEX: 22.63 KG/M2 | TEMPERATURE: 97.7 F | HEART RATE: 95 BPM

## 2023-06-09 DIAGNOSIS — F41.9 ANXIETY: ICD-10-CM

## 2023-06-09 DIAGNOSIS — F41.8 DEPRESSION WITH ANXIETY: ICD-10-CM

## 2023-06-09 PROCEDURE — 1160F RVW MEDS BY RX/DR IN RCRD: CPT | Performed by: FAMILY MEDICINE

## 2023-06-09 PROCEDURE — 1159F MED LIST DOCD IN RCRD: CPT | Performed by: FAMILY MEDICINE

## 2023-06-09 PROCEDURE — 99213 OFFICE O/P EST LOW 20 MIN: CPT | Performed by: FAMILY MEDICINE

## 2023-06-09 RX ORDER — BUSPIRONE HYDROCHLORIDE 5 MG/1
5 TABLET ORAL 3 TIMES DAILY
Qty: 270 TABLET | Refills: 1 | Status: SHIPPED | OUTPATIENT
Start: 2023-06-09

## 2023-06-09 NOTE — PROGRESS NOTES
Chief Complaint  Anxiety (F/u on last visit and nicotine patches )    Subjective          Jenifer Yeboah presents to [facility]  History of Present Illness  Patches working to quit vaping- pt has quit vaping    Anxiety  Presents for follow-up visit. Patient reports no chest pain, compulsions, confusion, decreased concentration, depressed mood, dizziness, dry mouth, excessive worry, feeling of choking, hyperventilation, insomnia, irritability, malaise, muscle tension, nausea, nervous/anxious behavior, obsessions, palpitations, panic, restlessness, shortness of breath or suicidal ideas. Symptoms occur occasionally. The severity of symptoms is moderate. The quality of sleep is good. Nighttime awakenings: none.     Compliance with medications is %. Treatment side effects: no side effects.     Objective   Allergies   Allergen Reactions    Sulfamethoxazole-Trimethoprim Hives    Naproxen Confusion     Started studerring     Immunization History   Administered Date(s) Administered    COVID-19 (MODERNA) 1st,2nd,3rd Dose Monovalent 08/06/2021, 08/31/2021    DTaP 2003, 2003, 2003, 01/04/2005, 04/04/2007    DTaP, Unspecified 2003, 2003, 2003, 01/04/2005, 04/04/2007    Flu Vaccine Quad PF >36MO 10/18/2018, 10/15/2019, 10/19/2020    Flu Vaccine Split Quad 10/18/2018, 10/15/2019, 10/19/2020    FluLaval/Fluzone >6mos 10/20/2021, 01/06/2023    FluMist 2-49yrs 11/18/2014, 10/06/2015    HPV Quadrivalent 08/04/2015    Hep A, 2 Dose 01/29/2014, 08/04/2015    Hep B, Adolescent or Pediatric 2003, 2003, 2003    Hep B, Unspecified 2003, 2003, 2003    HiB 2003, 2003, 2003, 01/04/2005    Hib (PRP-OMP) 2003, 2003, 2003, 01/04/2005    Hpv9 10/06/2015, 04/06/2016    IPV 2003, 2003, 2003, 04/04/2007    Influenza LAIV (Nasal) 10/09/2009, 01/14/2011, 10/10/2011, 12/10/2012, 10/12/2013     MMR 2004, 2007    Meningococcal MCV4P (Menactra) 2014, 2014, 2019    PEDS-Pneumococcal Conjugate (PCV7) 2003, 2003, 2003, 2005    Pneumococcal, Unspecified 2003, 2003, 2003, 2005    Polio, Unspecified 2003, 2003, 2003, 2007    Tdap 2014    Varicella 2004, 2007     Past Medical History:   Diagnosis Date    Allergic     Anxiety     Asthma     Depression     GERD (gastroesophageal reflux disease)       History reviewed. No pertinent surgical history.   Social History     Socioeconomic History    Marital status: Single   Tobacco Use    Smoking status: Former     Packs/day: 1.00     Years: 3.00     Pack years: 3.00     Types: Cigarettes     Start date: 2020     Quit date: 2023     Years since quittin.0    Smokeless tobacco: Never   Vaping Use    Vaping Use: Former    Substances: Nicotine, Flavoring    Devices: Disposable   Substance and Sexual Activity    Alcohol use: Never    Drug use: Never    Sexual activity: Defer     Birth control/protection: None        Current Outpatient Medications:     APAP-Pamabrom-Pyrilamine (PAMPRIN MULTI-SYMPTOM PO), Take  by mouth., Disp: , Rfl:     bisacodyl (Dulcolax) 5 MG EC tablet, Take 1 tablet by mouth Daily As Needed for Constipation., Disp: 30 tablet, Rfl: 5    busPIRone (BUSPAR) 5 MG tablet, Take 1 tablet by mouth 3 (Three) Times a Day., Disp: 270 tablet, Rfl: 1    cetirizine (zyrTEC) 10 MG tablet, Take 1 tablet by mouth Daily., Disp: 90 tablet, Rfl: 1    Cholecalciferol (Vitamin D3) 50 MCG (2000 UT) tablet, TAKE 1 TABLET BY MOUTH DAILY, Disp: 30 tablet, Rfl: 2    docusate sodium (COLACE) 100 MG capsule, TAKE 1 CAPSULE BY MOUTH EVERY DAY, Disp: 30 capsule, Rfl: 0    famotidine (PEPCID) 40 MG tablet, Take 1 tablet by mouth At Night As Needed for Heartburn., Disp: 90 tablet, Rfl: 0    folic acid (FOLVITE) 1 MG tablet, TAKE 1 TABLET BY MOUTH DAILY,  "Disp: 30 tablet, Rfl: 0    Multiple Vitamins-Minerals ([facility] DAILY GUMMIES PO), Take  by mouth. Gummy b 12, Disp: , Rfl:     nicotine (Nicoderm CQ) 14 MG/24HR patch, Place 1 patch on the skin as directed by provider Daily., Disp: 30 patch, Rfl: 0    ondansetron (Zofran) 4 MG tablet, Take 1 tablet by mouth 4 (Four) Times a Day As Needed for Nausea or Vomiting., Disp: 30 tablet, Rfl: 1    sertraline (ZOLOFT) 50 MG tablet, Take 1 tablet by mouth Daily., Disp: 90 tablet, Rfl: 1    vitamin B-12 (CYANOCOBALAMIN) 100 MCG tablet, Take 50 mcg by mouth Daily., Disp: , Rfl:    Family History   Problem Relation Age of Onset    No Known Problems Father     No Known Problems Mother     Heart disease Sister     Ovarian cancer Maternal Grandmother           Vital Signs:   Vitals:    06/09/23 0803   BP: 122/80   Pulse: 95   Temp: 97.7 °F (36.5 °C)   SpO2: 98%   Weight: 55.8 kg (123 lb)   Height: 157.5 cm (62\")       Review of Systems   Constitutional:  Negative for irritability.   Respiratory:  Negative for shortness of breath.    Cardiovascular:  Negative for chest pain and palpitations.   Gastrointestinal:  Negative for nausea.   Neurological:  Negative for dizziness.   Psychiatric/Behavioral:  Negative for confusion, decreased concentration and suicidal ideas. The patient is not nervous/anxious and does not have insomnia.     Physical Exam  Vitals reviewed.   Constitutional:       Appearance: Normal appearance. She is well-developed.   HENT:      Head: Normocephalic and atraumatic.      Right Ear: External ear normal.      Left Ear: External ear normal.      Mouth/Throat:      Pharynx: No oropharyngeal exudate.   Eyes:      Conjunctiva/sclera: Conjunctivae normal.      Pupils: Pupils are equal, round, and reactive to light.   Cardiovascular:      Rate and Rhythm: Normal rate and regular rhythm.      Pulses: Normal pulses.      Heart sounds: Normal heart sounds. No murmur heard.    No friction rub. No gallop.   Pulmonary:      " Effort: Pulmonary effort is normal.      Breath sounds: Normal breath sounds. No wheezing or rhonchi.   Abdominal:      General: Abdomen is flat. Bowel sounds are normal. There is no distension.      Palpations: Abdomen is soft. There is no mass.      Tenderness: There is no abdominal tenderness. There is no guarding or rebound.      Hernia: No hernia is present.   Musculoskeletal:         General: Normal range of motion.   Skin:     General: Skin is warm and dry.      Capillary Refill: Capillary refill takes less than 2 seconds.   Neurological:      General: No focal deficit present.      Mental Status: She is alert and oriented to person, place, and time.      Cranial Nerves: No cranial nerve deficit.   Psychiatric:         Mood and Affect: Mood and affect normal.         Behavior: Behavior normal.         Thought Content: Thought content normal.         Judgment: Judgment normal.      Result Review :                 Assessment and Plan    Diagnoses and all orders for this visit:    1. Anxiety  -     busPIRone (BUSPAR) 5 MG tablet; Take 1 tablet by mouth 3 (Three) Times a Day.  Dispense: 270 tablet; Refill: 1    2. Depression with anxiety  Comments:  Patient doing well on current dose of Zoloft.  Denies SI/HI.  Denies any concerns.  Refill sent in.  Orders:  -     sertraline (ZOLOFT) 50 MG tablet; Take 1 tablet by mouth Daily.  Dispense: 90 tablet; Refill: 1            Follow Up   Return in about 1 month (around 7/9/2023) for Recheck.  Patient was given instructions and counseling regarding her condition or for health maintenance advice. Please see specific information pulled into the AVS if appropriate.

## 2023-06-13 DIAGNOSIS — E55.9 VITAMIN D DEFICIENCY: ICD-10-CM

## 2023-06-13 RX ORDER — CHOLECALCIFEROL (VITAMIN D3) 125 MCG
CAPSULE ORAL
Qty: 30 TABLET | Refills: 2 | Status: SHIPPED | OUTPATIENT
Start: 2023-06-13

## 2023-07-27 DIAGNOSIS — K59.00 CONSTIPATION, UNSPECIFIED CONSTIPATION TYPE: ICD-10-CM

## 2023-07-27 DIAGNOSIS — E53.8 FOLIC ACID DEFICIENCY: ICD-10-CM

## 2023-07-27 RX ORDER — FOLIC ACID 1 MG/1
1 TABLET ORAL DAILY
Qty: 30 TABLET | Refills: 0 | Status: SHIPPED | OUTPATIENT
Start: 2023-07-27

## 2023-07-27 RX ORDER — DOCUSATE SODIUM 100 MG/1
CAPSULE, LIQUID FILLED ORAL
Qty: 30 CAPSULE | Refills: 0 | Status: SHIPPED | OUTPATIENT
Start: 2023-07-27

## 2023-08-04 ENCOUNTER — OFFICE VISIT (OUTPATIENT)
Dept: FAMILY MEDICINE CLINIC | Facility: CLINIC | Age: 18
End: 2023-08-04
Payer: COMMERCIAL

## 2023-08-04 VITALS
DIASTOLIC BLOOD PRESSURE: 60 MMHG | TEMPERATURE: 98.1 F | OXYGEN SATURATION: 99 % | SYSTOLIC BLOOD PRESSURE: 100 MMHG | WEIGHT: 129 LBS | BODY MASS INDEX: 23.59 KG/M2 | HEART RATE: 77 BPM

## 2023-08-04 DIAGNOSIS — F41.9 ANXIETY: ICD-10-CM

## 2023-08-04 DIAGNOSIS — F41.8 DEPRESSION WITH ANXIETY: ICD-10-CM

## 2023-08-04 DIAGNOSIS — Z72.0 TOBACCO ABUSE: ICD-10-CM

## 2023-08-04 PROCEDURE — 99213 OFFICE O/P EST LOW 20 MIN: CPT | Performed by: FAMILY MEDICINE

## 2023-08-04 PROCEDURE — 1159F MED LIST DOCD IN RCRD: CPT | Performed by: FAMILY MEDICINE

## 2023-08-04 PROCEDURE — 1160F RVW MEDS BY RX/DR IN RCRD: CPT | Performed by: FAMILY MEDICINE

## 2023-08-04 RX ORDER — BUSPIRONE HYDROCHLORIDE 5 MG/1
5 TABLET ORAL 3 TIMES DAILY
Qty: 270 TABLET | Refills: 1 | Status: SHIPPED | OUTPATIENT
Start: 2023-08-04

## 2023-08-26 DIAGNOSIS — E53.8 FOLIC ACID DEFICIENCY: ICD-10-CM

## 2023-08-28 RX ORDER — FOLIC ACID 1 MG/1
1 TABLET ORAL DAILY
Qty: 30 TABLET | Refills: 0 | Status: SHIPPED | OUTPATIENT
Start: 2023-08-28

## 2023-09-13 DIAGNOSIS — J30.9 ALLERGIC RHINITIS, UNSPECIFIED SEASONALITY, UNSPECIFIED TRIGGER: ICD-10-CM

## 2023-09-13 RX ORDER — CETIRIZINE HYDROCHLORIDE 10 MG/1
10 TABLET ORAL DAILY
Qty: 90 TABLET | Refills: 0 | Status: SHIPPED | OUTPATIENT
Start: 2023-09-13

## 2023-09-17 DIAGNOSIS — E55.9 VITAMIN D DEFICIENCY: ICD-10-CM

## 2023-09-18 RX ORDER — CHOLECALCIFEROL (VITAMIN D3) 125 MCG
CAPSULE ORAL
Qty: 30 TABLET | Refills: 2 | Status: SHIPPED | OUTPATIENT
Start: 2023-09-18

## 2023-09-24 DIAGNOSIS — E53.8 FOLIC ACID DEFICIENCY: ICD-10-CM

## 2023-09-25 RX ORDER — FOLIC ACID 1 MG/1
1 TABLET ORAL DAILY
Qty: 30 TABLET | Refills: 0 | Status: SHIPPED | OUTPATIENT
Start: 2023-09-25

## 2023-10-05 DIAGNOSIS — Z72.0 TOBACCO ABUSE: ICD-10-CM

## 2023-10-07 DIAGNOSIS — F41.9 ANXIETY: ICD-10-CM

## 2023-10-09 RX ORDER — BUSPIRONE HYDROCHLORIDE 5 MG/1
5 TABLET ORAL 3 TIMES DAILY
Qty: 90 TABLET | Refills: 0 | Status: SHIPPED | OUTPATIENT
Start: 2023-10-09

## 2023-10-24 DIAGNOSIS — E53.8 FOLIC ACID DEFICIENCY: ICD-10-CM

## 2023-10-24 RX ORDER — FOLIC ACID 1 MG/1
1 TABLET ORAL DAILY
Qty: 30 TABLET | Refills: 0 | Status: SHIPPED | OUTPATIENT
Start: 2023-10-24

## 2023-11-23 DIAGNOSIS — E53.8 FOLIC ACID DEFICIENCY: ICD-10-CM

## 2023-11-27 RX ORDER — FOLIC ACID 1 MG/1
1 TABLET ORAL DAILY
Qty: 30 TABLET | Refills: 0 | Status: SHIPPED | OUTPATIENT
Start: 2023-11-27

## 2023-12-23 DIAGNOSIS — J30.9 ALLERGIC RHINITIS, UNSPECIFIED SEASONALITY, UNSPECIFIED TRIGGER: ICD-10-CM

## 2023-12-23 DIAGNOSIS — E53.8 FOLIC ACID DEFICIENCY: ICD-10-CM

## 2023-12-26 RX ORDER — CETIRIZINE HYDROCHLORIDE 10 MG/1
10 TABLET ORAL DAILY
Qty: 90 TABLET | Refills: 0 | OUTPATIENT
Start: 2023-12-26

## 2023-12-26 RX ORDER — FOLIC ACID 1 MG/1
1 TABLET ORAL DAILY
Qty: 30 TABLET | Refills: 0 | OUTPATIENT
Start: 2023-12-26

## 2023-12-28 ENCOUNTER — OFFICE VISIT (OUTPATIENT)
Dept: FAMILY MEDICINE CLINIC | Facility: CLINIC | Age: 18
End: 2023-12-28
Payer: COMMERCIAL

## 2023-12-28 VITALS
WEIGHT: 130.2 LBS | OXYGEN SATURATION: 100 % | BODY MASS INDEX: 23.81 KG/M2 | DIASTOLIC BLOOD PRESSURE: 70 MMHG | TEMPERATURE: 97.5 F | HEART RATE: 64 BPM | SYSTOLIC BLOOD PRESSURE: 120 MMHG

## 2023-12-28 DIAGNOSIS — R11.2 NAUSEA AND VOMITING, UNSPECIFIED VOMITING TYPE: Primary | ICD-10-CM

## 2023-12-28 DIAGNOSIS — R19.7 DIARRHEA, UNSPECIFIED TYPE: ICD-10-CM

## 2023-12-28 LAB
EXPIRATION DATE: NORMAL
FLUAV AG UPPER RESP QL IA.RAPID: NOT DETECTED
FLUBV AG UPPER RESP QL IA.RAPID: NOT DETECTED
INTERNAL CONTROL: NORMAL
Lab: NORMAL
SARS-COV-2 AG UPPER RESP QL IA.RAPID: NOT DETECTED

## 2023-12-28 PROCEDURE — 1159F MED LIST DOCD IN RCRD: CPT | Performed by: FAMILY MEDICINE

## 2023-12-28 PROCEDURE — 1160F RVW MEDS BY RX/DR IN RCRD: CPT | Performed by: FAMILY MEDICINE

## 2023-12-28 PROCEDURE — 99213 OFFICE O/P EST LOW 20 MIN: CPT | Performed by: FAMILY MEDICINE

## 2023-12-28 PROCEDURE — 87428 SARSCOV & INF VIR A&B AG IA: CPT | Performed by: FAMILY MEDICINE

## 2023-12-28 RX ORDER — ONDANSETRON 4 MG/1
4 TABLET, FILM COATED ORAL 4 TIMES DAILY PRN
Qty: 28 TABLET | Refills: 1 | Status: SHIPPED | OUTPATIENT
Start: 2023-12-28

## 2023-12-28 RX ORDER — OCTISALATE, AVOBENZONE, HOMOSALATE, AND OCTOCRYLENE 29.4; 29.4; 49; 25.48 MG/ML; MG/ML; MG/ML; MG/ML
4 LOTION TOPICAL DAILY
Qty: 10 CAPSULE | Refills: 0 | Status: SHIPPED | OUTPATIENT
Start: 2023-12-28

## 2023-12-28 NOTE — PROGRESS NOTES
Chief Complaint  Diarrhea, Abdominal Pain, Vomiting, and Chills    Subjective          Jenifer Yeboah presents to [facility]  Abdominal Pain  This is a new problem. The current episode started today. The onset quality is sudden. The problem occurs intermittently. The problem is unchanged. The pain is located in the epigastric region. The pain is mild. The quality of the pain is described as aching. The pain does not radiate. Associated symptoms include diarrhea, nausea and vomiting. Pertinent negatives include no anorexia, anxiety, arthralgias, belching, constipation, dysuria, fever, flatus, frequency, headaches, hematochezia, hematuria, melena, myalgias, rash or sore throat. (Watery brown diarrhea) Nothing relieves the symptoms. Past treatments include nothing.       BMI is within normal parameters. No other follow-up for BMI required.       Objective   Allergies   Allergen Reactions    Sulfamethoxazole-Trimethoprim Hives    Naproxen Confusion     Started studerring     Immunization History   Administered Date(s) Administered    31-influenza Vac Quardvalent Preservativ 10/20/2021, 01/06/2023    COVID-19 (MODERNA) 1st,2nd,3rd Dose Monovalent 08/06/2021, 08/31/2021    DTaP 2003, 2003, 2003, 01/04/2005, 04/04/2007    DTaP, Unspecified 2003, 2003, 2003, 01/04/2005, 04/04/2007    Flu Vaccine Quad PF >36MO 10/18/2018, 10/15/2019, 10/19/2020    Flu Vaccine Split Quad 10/18/2018, 10/15/2019, 10/19/2020    FluMist 2-49yrs 11/18/2014, 10/06/2015    Fluzone (or Fluarix & Flulaval for VFC) >6mos 10/18/2018, 10/15/2019, 10/19/2020, 10/20/2021, 01/06/2023    HPV Quadrivalent 08/04/2015    Hep A, 2 Dose 01/29/2014, 08/04/2015    Hep B, Adolescent or Pediatric 2003, 2003, 2003    Hep B, Unspecified 2003, 2003, 2003    HiB 2003, 2003, 2003, 01/04/2005    Hib (PRP-OMP) 2003, 2003, 2003,  2005    Hpv9 10/06/2015, 2016    IPV 2003, 2003, 2003, 2007    Influenza LAIV (Nasal) 10/09/2009, 2011, 10/10/2011, 12/10/2012, 10/12/2013, 2014, 10/06/2015    Influenza, Unspecified 10/18/2018, 10/15/2019, 10/19/2020    MCV4 Unspecified 2014    MMR 2004, 2007    Meningococcal MCV4P (Menactra) 2014, 2014, 2019    PEDS-Pneumococcal Conjugate (PCV7) 2003, 2003, 2003, 2005    Pneumococcal, Unspecified 2003, 2003, 2003, 2005    Polio, Unspecified 2003, 2003, 2003, 2007    Tdap 2014    Varicella 2004, 2007     Past Medical History:   Diagnosis Date    Allergic     Anxiety     Asthma     Depression     GERD (gastroesophageal reflux disease)       History reviewed. No pertinent surgical history.   Social History     Socioeconomic History    Marital status: Single   Tobacco Use    Smoking status: Former     Packs/day: 1.00     Years: 3.00     Additional pack years: 0.00     Total pack years: 3.00     Types: Cigarettes     Start date: 2020     Quit date: 2023     Years since quittin.6    Smokeless tobacco: Never   Vaping Use    Vaping Use: Former    Substances: Nicotine, Flavoring    Devices: Disposable   Substance and Sexual Activity    Alcohol use: Never    Drug use: Never    Sexual activity: Defer     Birth control/protection: None        Current Outpatient Medications:     APAP-Pamabrom-Pyrilamine (PAMPRIN MULTI-SYMPTOM PO), Take  by mouth., Disp: , Rfl:     busPIRone (BUSPAR) 5 MG tablet, TAKE 1 TABLET BY MOUTH THREE TIMES DAILY, Disp: 90 tablet, Rfl: 0    cetirizine (zyrTEC) 10 MG tablet, Take 1 tablet by mouth Daily., Disp: 90 tablet, Rfl: 0    Cholecalciferol (Vitamin D3) 50 MCG (2000 UT) tablet, TAKE 1 TABLET BY MOUTH DAILY, Disp: 30 tablet, Rfl: 2    folic acid (FOLVITE) 1 MG tablet, TAKE 1 TABLET BY MOUTH DAILY, Disp: 30 tablet,  Rfl: 0    Multiple Vitamins-Minerals ([facility] DAILY GUMMIES PO), Take  by mouth. Gummy b 12, Disp: , Rfl:     sertraline (ZOLOFT) 50 MG tablet, Take 1 tablet by mouth Daily., Disp: 90 tablet, Rfl: 1    vitamin B-12 (CYANOCOBALAMIN) 100 MCG tablet, Take 0.5 tablets by mouth Daily., Disp: , Rfl:     bisacodyl (Dulcolax) 5 MG EC tablet, Take 1 tablet by mouth Daily As Needed for Constipation. (Patient not taking: Reported on 12/28/2023), Disp: 30 tablet, Rfl: 5    nicotine (Nicoderm CQ) 7 MG/24HR patch, Place 1 patch on the skin as directed by provider Daily. (Patient not taking: Reported on 12/28/2023), Disp: 30 patch, Rfl: 1    ondansetron (Zofran) 4 MG tablet, Take 1 tablet by mouth 4 (Four) Times a Day As Needed for Nausea or Vomiting., Disp: 28 tablet, Rfl: 1    Probiotic Product (Align) 4 MG capsule, Take 4 mg by mouth Daily., Disp: 10 capsule, Rfl: 0   Family History   Problem Relation Age of Onset    No Known Problems Father     No Known Problems Mother     Heart disease Sister     Ovarian cancer Maternal Grandmother           Vital Signs:   Vitals:    12/28/23 1555   BP: 120/70   BP Location: Right arm   Patient Position: Sitting   Cuff Size: Adult   Pulse: 64   Temp: 97.5 °F (36.4 °C)   SpO2: 100%   Weight: 59.1 kg (130 lb 3.2 oz)       Review of Systems   Constitutional:  Negative for fever.   HENT:  Negative for sore throat.    Gastrointestinal:  Positive for abdominal pain, diarrhea, nausea and vomiting. Negative for anorexia, constipation, flatus, hematochezia and melena.   Genitourinary:  Negative for dysuria, frequency and hematuria.   Musculoskeletal:  Negative for arthralgias and myalgias.   Skin:  Negative for rash.   Neurological:  Negative for headaches.   Psychiatric/Behavioral:  The patient is not nervous/anxious.       Physical Exam  Vitals reviewed.   Constitutional:       Appearance: Normal appearance. She is well-developed.   HENT:      Head: Normocephalic and atraumatic.      Right Ear: Tympanic  membrane, ear canal and external ear normal.      Left Ear: Tympanic membrane, ear canal and external ear normal.      Nose: Nose normal.      Mouth/Throat:      Mouth: Mucous membranes are moist.      Pharynx: Oropharynx is clear. No oropharyngeal exudate or posterior oropharyngeal erythema.   Eyes:      Conjunctiva/sclera: Conjunctivae normal.      Pupils: Pupils are equal, round, and reactive to light.   Cardiovascular:      Rate and Rhythm: Normal rate and regular rhythm.      Pulses: Normal pulses.      Heart sounds: Normal heart sounds. No murmur heard.     No friction rub. No gallop.   Pulmonary:      Effort: Pulmonary effort is normal.      Breath sounds: Normal breath sounds. No wheezing or rhonchi.   Abdominal:      General: Abdomen is flat. Bowel sounds are normal. There is no distension.      Palpations: Abdomen is soft. There is no mass.      Tenderness: There is no abdominal tenderness. There is no guarding or rebound.      Hernia: No hernia is present.   Musculoskeletal:         General: Normal range of motion.      Cervical back: Normal range of motion and neck supple.   Skin:     General: Skin is warm and dry.      Capillary Refill: Capillary refill takes less than 2 seconds.   Neurological:      General: No focal deficit present.      Mental Status: She is alert and oriented to person, place, and time.      Cranial Nerves: No cranial nerve deficit.   Psychiatric:         Mood and Affect: Mood and affect normal.         Behavior: Behavior normal.         Thought Content: Thought content normal.         Judgment: Judgment normal.        Result Review :                 Assessment and Plan    Diagnoses and all orders for this visit:    1. Nausea and vomiting, unspecified vomiting type (Primary)  -     POCT SARS-CoV-2 Antigen TONJA + Flu  -     ondansetron (Zofran) 4 MG tablet; Take 1 tablet by mouth 4 (Four) Times a Day As Needed for Nausea or Vomiting.  Dispense: 28 tablet; Refill: 1    2. Diarrhea,  unspecified type  -     POCT SARS-CoV-2 Antigen TONJA + Flu  -     Probiotic Product (Align) 4 MG capsule; Take 4 mg by mouth Daily.  Dispense: 10 capsule; Refill: 0            Follow Up   Return if symptoms worsen or fail to improve.  Patient was given instructions and counseling regarding her condition or for health maintenance advice. Please see specific information pulled into the AVS if appropriate.

## 2024-01-31 DIAGNOSIS — E55.9 VITAMIN D DEFICIENCY: ICD-10-CM

## 2024-01-31 RX ORDER — CHOLECALCIFEROL (VITAMIN D3) 125 MCG
CAPSULE ORAL
Qty: 30 TABLET | Refills: 2 | OUTPATIENT
Start: 2024-01-31

## 2024-02-09 ENCOUNTER — OFFICE VISIT (OUTPATIENT)
Dept: FAMILY MEDICINE CLINIC | Facility: CLINIC | Age: 19
End: 2024-02-09
Payer: COMMERCIAL

## 2024-02-09 VITALS
HEART RATE: 79 BPM | DIASTOLIC BLOOD PRESSURE: 80 MMHG | OXYGEN SATURATION: 97 % | SYSTOLIC BLOOD PRESSURE: 110 MMHG | WEIGHT: 125.8 LBS | TEMPERATURE: 97.8 F | BODY MASS INDEX: 23.75 KG/M2 | HEIGHT: 61 IN

## 2024-02-09 DIAGNOSIS — Z23 NEED FOR TDAP VACCINATION: ICD-10-CM

## 2024-02-09 DIAGNOSIS — F41.9 ANXIETY: ICD-10-CM

## 2024-02-09 DIAGNOSIS — F41.8 DEPRESSION WITH ANXIETY: ICD-10-CM

## 2024-02-09 DIAGNOSIS — Z79.899 DRUG THERAPY: Primary | ICD-10-CM

## 2024-02-09 LAB
ALBUMIN SERPL-MCNC: 4.2 G/DL (ref 3.5–5.2)
ALBUMIN/GLOB SERPL: 1.3 G/DL
ALP SERPL-CCNC: 63 U/L (ref 39–117)
ALT SERPL W P-5'-P-CCNC: 7 U/L (ref 1–33)
ANION GAP SERPL CALCULATED.3IONS-SCNC: 10.2 MMOL/L (ref 5–15)
AST SERPL-CCNC: 19 U/L (ref 1–32)
BASOPHILS # BLD AUTO: 0.02 10*3/MM3 (ref 0–0.2)
BASOPHILS NFR BLD AUTO: 0.4 % (ref 0–1.5)
BILIRUB SERPL-MCNC: 0.3 MG/DL (ref 0–1.2)
BUN SERPL-MCNC: 9 MG/DL (ref 6–20)
BUN/CREAT SERPL: 12 (ref 7–25)
CALCIUM SPEC-SCNC: 9.3 MG/DL (ref 8.6–10.5)
CHLORIDE SERPL-SCNC: 103 MMOL/L (ref 98–107)
CO2 SERPL-SCNC: 25.8 MMOL/L (ref 22–29)
CREAT SERPL-MCNC: 0.75 MG/DL (ref 0.57–1)
DEPRECATED RDW RBC AUTO: 39.9 FL (ref 37–54)
EGFRCR SERPLBLD CKD-EPI 2021: 117.1 ML/MIN/1.73
EOSINOPHIL # BLD AUTO: 0.05 10*3/MM3 (ref 0–0.4)
EOSINOPHIL NFR BLD AUTO: 1 % (ref 0.3–6.2)
ERYTHROCYTE [DISTWIDTH] IN BLOOD BY AUTOMATED COUNT: 13.6 % (ref 12.3–15.4)
GLOBULIN UR ELPH-MCNC: 3.3 GM/DL
GLUCOSE SERPL-MCNC: 81 MG/DL (ref 65–99)
HCT VFR BLD AUTO: 38.8 % (ref 34–46.6)
HGB BLD-MCNC: 12.8 G/DL (ref 12–15.9)
IMM GRANULOCYTES # BLD AUTO: 0.02 10*3/MM3 (ref 0–0.05)
IMM GRANULOCYTES NFR BLD AUTO: 0.4 % (ref 0–0.5)
LYMPHOCYTES # BLD AUTO: 1.07 10*3/MM3 (ref 0.7–3.1)
LYMPHOCYTES NFR BLD AUTO: 20.3 % (ref 19.6–45.3)
MCH RBC QN AUTO: 26.8 PG (ref 26.6–33)
MCHC RBC AUTO-ENTMCNC: 33 G/DL (ref 31.5–35.7)
MCV RBC AUTO: 81.2 FL (ref 79–97)
MONOCYTES # BLD AUTO: 0.43 10*3/MM3 (ref 0.1–0.9)
MONOCYTES NFR BLD AUTO: 8.2 % (ref 5–12)
NEUTROPHILS NFR BLD AUTO: 3.67 10*3/MM3 (ref 1.7–7)
NEUTROPHILS NFR BLD AUTO: 69.7 % (ref 42.7–76)
NRBC BLD AUTO-RTO: 0 /100 WBC (ref 0–0.2)
PLATELET # BLD AUTO: 316 10*3/MM3 (ref 140–450)
PMV BLD AUTO: 11.3 FL (ref 6–12)
POTASSIUM SERPL-SCNC: 4 MMOL/L (ref 3.5–5.2)
PROT SERPL-MCNC: 7.5 G/DL (ref 6–8.5)
RBC # BLD AUTO: 4.78 10*6/MM3 (ref 3.77–5.28)
SODIUM SERPL-SCNC: 139 MMOL/L (ref 136–145)
T4 FREE SERPL-MCNC: 1.21 NG/DL (ref 0.93–1.7)
TSH SERPL DL<=0.05 MIU/L-ACNC: 0.61 UIU/ML (ref 0.27–4.2)
WBC NRBC COR # BLD AUTO: 5.26 10*3/MM3 (ref 3.4–10.8)

## 2024-02-09 PROCEDURE — 80053 COMPREHEN METABOLIC PANEL: CPT | Performed by: FAMILY MEDICINE

## 2024-02-09 PROCEDURE — 84443 ASSAY THYROID STIM HORMONE: CPT | Performed by: FAMILY MEDICINE

## 2024-02-09 PROCEDURE — 84439 ASSAY OF FREE THYROXINE: CPT | Performed by: FAMILY MEDICINE

## 2024-02-09 PROCEDURE — 85025 COMPLETE CBC W/AUTO DIFF WBC: CPT | Performed by: FAMILY MEDICINE

## 2024-02-09 RX ORDER — BUSPIRONE HYDROCHLORIDE 5 MG/1
5 TABLET ORAL 3 TIMES DAILY
Qty: 90 TABLET | Refills: 1 | Status: SHIPPED | OUTPATIENT
Start: 2024-02-09

## 2024-02-09 NOTE — PROGRESS NOTES
Venipuncture Blood Specimen Collection  Venipuncture performed in left arm by Alba Pruett with good hemostasis. Patient tolerated the procedure well without complications.   02/09/24   Alba Pruett

## 2024-02-09 NOTE — PROGRESS NOTES
Chief Complaint  Anxiety, Depression, and Annual Exam    Subjective          Jenifer Yeboah presents to [facility]  Depression  Visit Type: follow-up  Patient is not experiencing: anhedonia, chest pain, choking sensation, compulsions, confusion, decreased concentration, depressed mood, dizziness, dry mouth, excessive worry, fatigue, feelings of hopelessness, feelings of worthlessness, hypersomnia, hyperventilation, insomnia, irritability, malaise, memory impairment, muscle tension, nausea, nervousness/anxiety, obsessions, palpitations, panic, psychomotor agitation, psychomotor retardation, restlessness, shortness of breath, suicidal ideas, suicidal planning, thoughts of death, weight gain and weight loss.  Frequency of symptoms: occasionally   Severity: moderate   Sleep quality: good  Nighttime awakenings: none  Compliance with medications:  %  Treatment side effects: no side effects.  Anxiety  Presents for follow-up visit. Patient reports no chest pain, compulsions, confusion, decreased concentration, depressed mood, dizziness, dry mouth, excessive worry, feeling of choking, hyperventilation, insomnia, irritability, malaise, muscle tension, nausea, nervous/anxious behavior, obsessions, palpitations, panic, restlessness, shortness of breath or suicidal ideas. Symptoms occur occasionally. The severity of symptoms is moderate. The quality of sleep is good. Nighttime awakenings: none.     Her past medical history is significant for depression. Compliance with medications is %. Treatment side effects: no side effects.       BMI is within normal parameters. No other follow-up for BMI required.       Objective   Allergies   Allergen Reactions    Sulfamethoxazole-Trimethoprim Hives    Naproxen Confusion     Started studerring     Immunization History   Administered Date(s) Administered    31-influenza Vac Quardvalent Preservativ 10/20/2021, 01/06/2023    COVID-19 (MODERNA)  1st,2nd,3rd Dose Monovalent 08/06/2021, 08/31/2021    DTaP 2003, 2003, 2003, 01/04/2005, 04/04/2007    DTaP, Unspecified 2003, 2003, 2003, 01/04/2005, 04/04/2007    Flu Vaccine Quad PF >36MO 10/18/2018, 10/15/2019, 10/19/2020    Flu Vaccine Split Quad 10/18/2018, 10/15/2019, 10/19/2020    FluMist 2-49yrs 11/18/2014, 10/06/2015    Fluzone (or Fluarix & Flulaval for VFC) >6mos 10/18/2018, 10/15/2019, 10/19/2020, 10/20/2021, 01/06/2023    HPV Quadrivalent 08/04/2015    Hep A, 2 Dose 01/29/2014, 08/04/2015    Hep B, Adolescent or Pediatric 2003, 2003, 2003    Hep B, Unspecified 2003, 2003, 2003    HiB 2003, 2003, 2003, 01/04/2005    Hib (PRP-OMP) 2003, 2003, 2003, 01/04/2005    Hpv9 10/06/2015, 04/06/2016    IPV 2003, 2003, 2003, 04/04/2007    Influenza LAIV (Nasal) 10/09/2009, 01/14/2011, 10/10/2011, 12/10/2012, 10/12/2013, 11/18/2014, 10/06/2015    Influenza, Unspecified 10/18/2018, 10/15/2019, 10/19/2020    MCV4 Unspecified 01/29/2014    MMR 02/23/2004, 04/04/2007    Meningococcal MCV4P (Menactra) 01/29/2014, 01/29/2014, 02/19/2019    PEDS-Pneumococcal Conjugate (PCV7) 2003, 2003, 2003, 01/04/2005    Pneumococcal, Unspecified 2003, 2003, 2003, 01/04/2005    Polio, Unspecified 2003, 2003, 2003, 04/04/2007    Tdap 01/29/2014, 02/09/2024    Varicella 02/23/2004, 04/04/2007     Past Medical History:   Diagnosis Date    Allergic     Anxiety     Asthma     Depression     GERD (gastroesophageal reflux disease)       History reviewed. No pertinent surgical history.   Social History     Socioeconomic History    Marital status: Single   Tobacco Use    Smoking status: Former     Packs/day: 1.00     Years: 3.00     Additional pack years: 0.00     Total pack years: 3.00     Types: Cigarettes     Start date: 1/1/2020     Quit date: 5/9/2023     Years  "since quittin.7    Smokeless tobacco: Never   Vaping Use    Vaping Use: Former    Substances: Nicotine, Flavoring    Devices: Disposable   Substance and Sexual Activity    Alcohol use: Never    Drug use: Never    Sexual activity: Defer     Birth control/protection: None        Current Outpatient Medications:     APAP-Pamabrom-Pyrilamine (PAMPRIN MULTI-SYMPTOM PO), Take  by mouth., Disp: , Rfl:     busPIRone (BUSPAR) 5 MG tablet, Take 1 tablet by mouth 3 (Three) Times a Day., Disp: 90 tablet, Rfl: 1    cetirizine (zyrTEC) 10 MG tablet, Take 1 tablet by mouth Daily., Disp: 90 tablet, Rfl: 0    Cholecalciferol (Vitamin D3) 50 MCG (2000 UT) tablet, TAKE 1 TABLET BY MOUTH DAILY, Disp: 30 tablet, Rfl: 2    folic acid (FOLVITE) 1 MG tablet, TAKE 1 TABLET BY MOUTH DAILY, Disp: 30 tablet, Rfl: 0    Multiple Vitamins-Minerals ([facility] DAILY GUMMIES PO), Take  by mouth. Gummy b 12, Disp: , Rfl:     ondansetron (Zofran) 4 MG tablet, Take 1 tablet by mouth 4 (Four) Times a Day As Needed for Nausea or Vomiting., Disp: 28 tablet, Rfl: 1    Probiotic Product (Align) 4 MG capsule, Take 4 mg by mouth Daily., Disp: 10 capsule, Rfl: 0    sertraline (ZOLOFT) 50 MG tablet, Take 1 tablet by mouth Daily., Disp: 90 tablet, Rfl: 1    vitamin B-12 (CYANOCOBALAMIN) 100 MCG tablet, Take 0.5 tablets by mouth Daily., Disp: , Rfl:    Family History   Problem Relation Age of Onset    Depression Mother     Anxiety disorder Mother     Depression Father     Anxiety disorder Father     Heart disease Sister     Diabetes Sister     Ovarian cancer Paternal Grandmother     COPD Paternal Grandmother     Lung disease Paternal Grandmother           Vital Signs:   Vitals:    24 1043   BP: 110/80   BP Location: Right arm   Patient Position: Sitting   Cuff Size: Adult   Pulse: 79   Temp: 97.8 °F (36.6 °C)   SpO2: 97%   Weight: 57.1 kg (125 lb 12.8 oz)   Height: 154.9 cm (61\")       Review of Systems   Constitutional:  Negative for fatigue, fever, " irritability, weight gain and weight loss.   HENT:  Negative for sore throat.    Eyes:  Negative for visual disturbance.   Respiratory:  Negative for cough, choking, chest tightness, shortness of breath and wheezing.    Cardiovascular:  Negative for chest pain, palpitations and leg swelling.   Gastrointestinal:  Negative for abdominal pain, diarrhea, nausea and vomiting.   Neurological:  Negative for dizziness, light-headedness and headaches.   Psychiatric/Behavioral:  Negative for confusion, decreased concentration and suicidal ideas. The patient is not nervous/anxious and does not have insomnia.       Physical Exam  Vitals reviewed.   Constitutional:       Appearance: Normal appearance. She is well-developed.   HENT:      Head: Normocephalic and atraumatic.      Right Ear: External ear normal.      Left Ear: External ear normal.      Mouth/Throat:      Pharynx: No oropharyngeal exudate.   Eyes:      Conjunctiva/sclera: Conjunctivae normal.      Pupils: Pupils are equal, round, and reactive to light.   Cardiovascular:      Rate and Rhythm: Normal rate and regular rhythm.      Pulses: Normal pulses.      Heart sounds: Normal heart sounds. No murmur heard.     No friction rub. No gallop.   Pulmonary:      Effort: Pulmonary effort is normal.      Breath sounds: Normal breath sounds. No wheezing or rhonchi.   Abdominal:      General: Abdomen is flat. Bowel sounds are normal. There is no distension.      Palpations: Abdomen is soft. There is no mass.      Tenderness: There is no abdominal tenderness. There is no guarding or rebound.      Hernia: No hernia is present.   Musculoskeletal:         General: Normal range of motion.      Cervical back: Normal range of motion and neck supple.   Skin:     General: Skin is warm and dry.      Capillary Refill: Capillary refill takes less than 2 seconds.   Neurological:      General: No focal deficit present.      Mental Status: She is alert and oriented to person, place, and time.       Cranial Nerves: No cranial nerve deficit.   Psychiatric:         Mood and Affect: Mood and affect normal.         Behavior: Behavior normal.         Thought Content: Thought content normal.         Judgment: Judgment normal.        Result Review :   The following data was reviewed by: Saurabh German MD on 02/09/2024:  CMP          5/23/2023    09:25   CMP   Glucose 63    BUN 12    Creatinine 0.75    EGFR 117.1    Sodium 140    Potassium 3.8    Chloride 101    Calcium 9.8    Total Protein 7.9    Albumin 4.4    Globulin 3.5    Total Bilirubin 0.3    Alkaline Phosphatase 65    AST (SGOT) 19    ALT (SGPT) 12    Albumin/Globulin Ratio 1.3    BUN/Creatinine Ratio 16.0    Anion Gap 18.7      CBC          5/23/2023    09:25   CBC   WBC 10.10    RBC 4.87    Hemoglobin 12.8    Hematocrit 39.4    MCV 80.9    MCH 26.3    MCHC 32.5    RDW 13.5    Platelets 308        TSH          5/23/2023    09:25   TSH   TSH 0.309                Assessment and Plan    Diagnoses and all orders for this visit:    1. Drug therapy (Primary)  -     CBC Auto Differential  -     Comprehensive Metabolic Panel  -     TSH+Free T4    2. Depression with anxiety  Comments:  Patient doing well on current dose of Zoloft.  Denies SI/HI.  Denies any concerns.  Refill sent in.  Orders:  -     sertraline (ZOLOFT) 50 MG tablet; Take 1 tablet by mouth Daily.  Dispense: 90 tablet; Refill: 1  -     CBC Auto Differential  -     Comprehensive Metabolic Panel  -     TSH+Free T4    3. Anxiety  -     busPIRone (BUSPAR) 5 MG tablet; Take 1 tablet by mouth 3 (Three) Times a Day.  Dispense: 90 tablet; Refill: 1    4. Need for Tdap vaccination  -     Tdap Vaccine => 8yo IM (BOOSTRIX)            Follow Up   Return in about 6 months (around 8/9/2024) for Recheck.  Patient was given instructions and counseling regarding her condition or for health maintenance advice. Please see specific information pulled into the AVS if appropriate.

## 2024-02-23 ENCOUNTER — OFFICE VISIT (OUTPATIENT)
Dept: FAMILY MEDICINE CLINIC | Facility: CLINIC | Age: 19
End: 2024-02-23
Payer: COMMERCIAL

## 2024-02-23 VITALS
OXYGEN SATURATION: 98 % | HEIGHT: 61 IN | TEMPERATURE: 97.8 F | WEIGHT: 123.8 LBS | BODY MASS INDEX: 23.37 KG/M2 | HEART RATE: 108 BPM

## 2024-02-23 DIAGNOSIS — N89.8 VAGINAL DISCHARGE: Primary | ICD-10-CM

## 2024-02-23 PROBLEM — J30.9 ALLERGIC RHINITIS: Status: ACTIVE | Noted: 2023-03-03

## 2024-02-23 PROBLEM — Q21.10 ATRIAL SEPTAL DEFECT: Status: ACTIVE | Noted: 2022-02-02

## 2024-02-23 PROBLEM — F33.41 MAJOR DEPRESSIVE DISORDER, RECURRENT EPISODE, IN PARTIAL REMISSION: Chronic | Status: ACTIVE | Noted: 2023-03-03

## 2024-02-23 LAB
BILIRUB BLD-MCNC: NEGATIVE MG/DL
CLARITY, POC: CLEAR
COLOR UR: YELLOW
EXPIRATION DATE: ABNORMAL
GLUCOSE UR STRIP-MCNC: NEGATIVE MG/DL
KETONES UR QL: ABNORMAL
LEUKOCYTE EST, POC: ABNORMAL
Lab: ABNORMAL
NITRITE UR-MCNC: NEGATIVE MG/ML
PH UR: 5.5 [PH] (ref 5–8)
PROT UR STRIP-MCNC: NEGATIVE MG/DL
RBC # UR STRIP: NEGATIVE /UL
SP GR UR: 1.02 (ref 1–1.03)
UROBILINOGEN UR QL: ABNORMAL

## 2024-02-23 PROCEDURE — 87086 URINE CULTURE/COLONY COUNT: CPT | Performed by: FAMILY MEDICINE

## 2024-02-23 PROCEDURE — 87147 CULTURE TYPE IMMUNOLOGIC: CPT | Performed by: FAMILY MEDICINE

## 2024-02-23 RX ORDER — FLUCONAZOLE 150 MG/1
150 TABLET ORAL ONCE
Qty: 2 TABLET | Refills: 0 | Status: SHIPPED | OUTPATIENT
Start: 2024-02-23 | End: 2024-02-23

## 2024-02-23 NOTE — PROGRESS NOTES
"Chief Complaint    Vaginal Discharge (Vaginal discharge, odor, lower abdominal/back pain, frequent urination x couple weeks.)    Subjective      Jenifer Yeboah presents to [facility]    History of Present Illness    1.)  VAGINITIS : Onset - 2 weeks ago.  Reports whitish, clumpy discharge.  Reports 'sour' smell at the end of the day.  Also reports concern for urinary symptoms secondary to lower abdominal pain and recent onset urinary frequency.    Objective     Vital Signs:     Pulse 108   Temp 97.8 °F (36.6 °C) (Temporal)   Ht 154.9 cm (61\")   Wt 56.2 kg (123 lb 12.8 oz)   SpO2 98%   BMI 23.39 kg/m²       Physical Exam  Vitals reviewed.   Constitutional:       General: She is not in acute distress.     Appearance: Normal appearance. She is well-developed.   HENT:      Head: Normocephalic and atraumatic.      Right Ear: Hearing and external ear normal.      Left Ear: Hearing and external ear normal.      Nose: Nose normal.   Eyes:      General: Lids are normal.         Right eye: No discharge.         Left eye: No discharge.      Conjunctiva/sclera: Conjunctivae normal.   Pulmonary:      Effort: Pulmonary effort is normal.   Abdominal:      General: There is no distension.   Musculoskeletal:         General: No swelling.      Cervical back: Neck supple.   Skin:     Coloration: Skin is not jaundiced.      Findings: No erythema.   Neurological:      Mental Status: She is alert. Mental status is at baseline.   Psychiatric:         Mood and Affect: Mood and affect normal.         Thought Content: Thought content normal.     BMI is within normal parameters. No other follow-up for BMI required.    Assessment and Plan     Diagnoses and all orders for this visit:    1. Vaginal discharge (Primary)  Comments:  UA reviewed, suspect a yeast infection based on clinical symptoms.  Diflucan as noted.  Urine culture as noted - additional recs per review.  Orders:  -     Cancel: POCT urinalysis " dipstick, automated  -     POCT urinalysis dipstick, automated  -     Urine Culture - Urine, Urine, Clean Catch    Other orders  -     fluconazole (Diflucan) 150 MG tablet; Take 1 tablet by mouth 1 (One) Time for 1 dose. Repeat in 72 hrs if no relief.  Dispense: 2 tablet; Refill: 0    Follow Up     Return if symptoms worsen or fail to improve.    Patient was given instructions and counseling regarding her condition or for health maintenance advice. Please see specific information pulled into the AVS if appropriate.

## 2024-02-24 LAB — BACTERIA SPEC AEROBE CULT: ABNORMAL

## 2024-02-26 RX ORDER — AMOXICILLIN 500 MG/1
500 CAPSULE ORAL 2 TIMES DAILY
Qty: 14 CAPSULE | Refills: 0 | Status: SHIPPED | OUTPATIENT
Start: 2024-02-26 | End: 2024-03-04

## 2024-03-11 ENCOUNTER — OFFICE VISIT (OUTPATIENT)
Dept: FAMILY MEDICINE CLINIC | Facility: CLINIC | Age: 19
End: 2024-03-11
Payer: COMMERCIAL

## 2024-03-11 VITALS
TEMPERATURE: 97.3 F | BODY MASS INDEX: 23.6 KG/M2 | SYSTOLIC BLOOD PRESSURE: 110 MMHG | OXYGEN SATURATION: 100 % | HEIGHT: 61 IN | HEART RATE: 65 BPM | DIASTOLIC BLOOD PRESSURE: 68 MMHG | WEIGHT: 125 LBS

## 2024-03-11 DIAGNOSIS — R30.0 DYSURIA: Primary | ICD-10-CM

## 2024-03-11 PROCEDURE — 99213 OFFICE O/P EST LOW 20 MIN: CPT | Performed by: FAMILY MEDICINE

## 2024-03-11 PROCEDURE — 87086 URINE CULTURE/COLONY COUNT: CPT | Performed by: FAMILY MEDICINE

## 2024-03-11 NOTE — PROGRESS NOTES
"Chief Complaint    Urinary Tract Infection (Follow-up from 02/23 office visit.  States that she finished the antibiotic, but continues to have pressure in lower abdomen and lower back.  Constantly feels like her bladder is full and sometimes feels like she has to \"force the urine out\".)    Subjective      Jenifer Yeboah presents to [facility]    History of Present Illness    1.) DYSURIA/PELVIC PAIN : Presents for follow-up visit, where she was recently tested for questionable UTI.  Urine culture revealed questionable colonization.  However, patient was treated with amoxicillin, as bacteria noted per culture susceptible to penicillin.  Pt has completed the penicillin course and reports that she continues to experience pelvic pain with radiation to her back.  She is also reporting urinary frequency + bladder fullness.    Objective     Vital Signs:     /68 (BP Location: Left arm, Patient Position: Sitting, Cuff Size: Adult)   Pulse 65   Temp 97.3 °F (36.3 °C) (Temporal)   Ht 154.9 cm (61\")   Wt 56.7 kg (125 lb)   SpO2 100%   BMI 23.62 kg/m²       Physical Exam  Vitals reviewed.   Constitutional:       General: She is not in acute distress.     Appearance: Normal appearance. She is well-developed.   HENT:      Head: Normocephalic and atraumatic.      Right Ear: Hearing and external ear normal.      Left Ear: Hearing and external ear normal.      Nose: Nose normal.   Eyes:      General: Lids are normal.         Right eye: No discharge.         Left eye: No discharge.      Conjunctiva/sclera: Conjunctivae normal.   Pulmonary:      Effort: Pulmonary effort is normal.   Abdominal:      General: There is no distension.   Musculoskeletal:         General: No swelling.      Cervical back: Neck supple.   Skin:     Coloration: Skin is not jaundiced.      Findings: No erythema.   Neurological:      Mental Status: She is alert. Mental status is at baseline.   Psychiatric:         Mood " and Affect: Mood and affect normal.         Thought Content: Thought content normal.     BMI is within normal parameters. No other follow-up for BMI required.     Assessment and Plan     Diagnoses and all orders for this visit:    1. Dysuria (Primary)  Comments:  Repeat urine culture, since pt has finished course of abx.  Advised that if culture is negative, will likely consider referral to a specialist.  Orders:  -     Urine Culture - Urine, Urine, Clean Catch    Follow Up     Return TBD per review of labs.    Patient was given instructions and counseling regarding her condition or for health maintenance advice. Please see specific information pulled into the AVS if appropriate.

## 2024-03-12 LAB — BACTERIA SPEC AEROBE CULT: NO GROWTH

## 2024-03-26 NOTE — PROGRESS NOTES
"GYN Problem/Follow Up Visit    Chief Complaint   Patient presents with    Follow-up     Complaints of vaginal discharge, odor and pelvic pain           HPI  Jenifer Yeboah is a 19 y.o. female, , who presents for follow up after treated for UTI 2-23 - strep group b, urinary discomfort resolved however after treatment developed green tinted vaginal discharge with odor- at times water.        Condoms for birth control, considering hormonal contraceptives, poor pill taker      Additional OB/GYN History   Patient's last menstrual period was 2024 (within days).  Current contraception: contraceptive methods: Condoms    Past Medical History:   Diagnosis Date    Allergic     Anxiety     Asthma     Depression     GERD (gastroesophageal reflux disease)       History reviewed. No pertinent surgical history.   Family History   Problem Relation Age of Onset    Depression Father     Anxiety disorder Father     Depression Mother     Anxiety disorder Mother     Heart disease Sister     Diabetes Sister     Ovarian cancer Paternal Grandmother     COPD Paternal Grandmother     Lung disease Paternal Grandmother     Breast cancer Neg Hx     Uterine cancer Neg Hx     Colon cancer Neg Hx      Allergies as of 2024 - Reviewed 2024   Allergen Reaction Noted    Sulfamethoxazole-trimethoprim Hives 2012    Naproxen Confusion 2021      The additional following portions of the patient's history were reviewed and updated as appropriate: allergies, current medications, past family history, past medical history, past social history, past surgical history, and problem list.    Review of Systems    See HPI for pertinent ROS    Objective   /65   Pulse 73   Ht 154.9 cm (61\")   Wt 54.4 kg (120 lb)   LMP 2024 (Within Days)   BMI 22.67 kg/m²     Physical Exam  Vitals and nursing note reviewed. Exam conducted with a chaperone present.   Constitutional:       Appearance: Normal appearance. "   Cardiovascular:      Rate and Rhythm: Normal rate.   Pulmonary:      Effort: Pulmonary effort is normal.   Genitourinary:     General: Normal vulva.      Vagina: Normal.      Cervix: Normal.      Uterus: Normal.       Adnexa: Right adnexa normal and left adnexa normal.   Lymphadenopathy:      Lower Body: No right inguinal adenopathy. No left inguinal adenopathy.   Skin:     General: Skin is warm and dry.   Neurological:      Mental Status: She is alert and oriented to person, place, and time.            Assessment and Plan    Diagnoses and all orders for this visit:    1. Acute vaginitis (Primary)  -     Chlamydia trachomatis, Neisseria gonorrhoeae, PCR - Swab, Cervix  -     Gardnerella vaginalis, Trichomonas vaginalis, Candida albicans, DNA - Swab, Vagina        Counseling:  TRACK MENSES, RTO if <q21d, >7d long, heavy or painful.    All BIRTH CONTROL options R/B/A/SE/E of each reviewed in detail.  SAFE SEX/condoms importance reviewed.    Bacterial Vaginosis prevention: recommend use hypoallergenic, PH balanced products including soaps and detergents, sensitive skin products. Avoid scented liners, tampons, wipes, or scented toilet paper. Condom use, avoid multiple partners, douching, bubble bath, anal contact during intercourse, thong underwear, and shaving in genital area. Over the counter vaginal probiotic suppositories may be used to maintain a healthy vaginal PH- example: [employer] online (Vagibiom)  Pamphlets provided for nexplanon and IUD, declines contraception at this time    Follow Up:  Return for WWE.        Fabi Chance, APRN  03/29/2024

## 2024-03-29 ENCOUNTER — OFFICE VISIT (OUTPATIENT)
Dept: OBSTETRICS AND GYNECOLOGY | Facility: CLINIC | Age: 19
End: 2024-03-29
Payer: COMMERCIAL

## 2024-03-29 VITALS
WEIGHT: 120 LBS | HEART RATE: 73 BPM | DIASTOLIC BLOOD PRESSURE: 65 MMHG | BODY MASS INDEX: 22.66 KG/M2 | SYSTOLIC BLOOD PRESSURE: 117 MMHG | HEIGHT: 61 IN

## 2024-03-29 DIAGNOSIS — N76.0 ACUTE VAGINITIS: Primary | ICD-10-CM

## 2024-03-29 LAB
C TRACH RRNA CVX QL NAA+PROBE: NOT DETECTED
CANDIDA SPECIES: NEGATIVE
GARDNERELLA VAGINALIS: NEGATIVE
N GONORRHOEA RRNA SPEC QL NAA+PROBE: NOT DETECTED
T VAGINALIS DNA VAG QL PROBE+SIG AMP: NEGATIVE

## 2024-03-29 PROCEDURE — 87591 N.GONORRHOEAE DNA AMP PROB: CPT | Performed by: NURSE PRACTITIONER

## 2024-03-29 PROCEDURE — 87491 CHLMYD TRACH DNA AMP PROBE: CPT | Performed by: NURSE PRACTITIONER

## 2024-03-29 PROCEDURE — 87480 CANDIDA DNA DIR PROBE: CPT | Performed by: NURSE PRACTITIONER

## 2024-03-29 PROCEDURE — 87660 TRICHOMONAS VAGIN DIR PROBE: CPT | Performed by: NURSE PRACTITIONER

## 2024-03-29 PROCEDURE — 87510 GARDNER VAG DNA DIR PROBE: CPT | Performed by: NURSE PRACTITIONER

## 2024-04-24 DIAGNOSIS — J30.9 ALLERGIC RHINITIS, UNSPECIFIED SEASONALITY, UNSPECIFIED TRIGGER: ICD-10-CM

## 2024-04-24 RX ORDER — CETIRIZINE HYDROCHLORIDE 10 MG/1
10 TABLET ORAL DAILY
Qty: 90 TABLET | Refills: 0 | Status: SHIPPED | OUTPATIENT
Start: 2024-04-24

## 2024-05-22 NOTE — PROGRESS NOTES
"Chief Complaint   Patient presents with    Annual Exam       HPI:   19 y.o. . Presents for well woman exam. Contraception:  Condoms  Menses:   q month, lasts 5 days, changes ultra pad q 2 hrs on heaviest days.   Pain:  Mild, OTC meds control discomfort  Last pap: none prior   No complaints     Past Medical History:   Diagnosis Date    Allergic     Anxiety     Asthma     Depression     GERD (gastroesophageal reflux disease)       History reviewed. No pertinent surgical history.   Family History   Problem Relation Age of Onset    Depression Father     Anxiety disorder Father     Depression Mother     Anxiety disorder Mother     Heart disease Sister     Diabetes Sister     Ovarian cancer Paternal Grandmother     COPD Paternal Grandmother     Lung disease Paternal Grandmother     Breast cancer Neg Hx     Uterine cancer Neg Hx     Colon cancer Neg Hx     Deep vein thrombosis Neg Hx     Pulmonary embolism Neg Hx      Allergies as of 2024 - Reviewed 2024   Allergen Reaction Noted    Sulfamethoxazole-trimethoprim Hives 2012    Naproxen Confusion 2021        PCP: does manage PMHx and preventative labs    /75   Pulse 85   Ht 154.9 cm (61\")   Wt 50.8 kg (112 lb)   LMP 2024 (Exact Date)   BMI 21.16 kg/m²     PHYSICAL EXAM: Chaperone present   General- NAD, alert and oriented, appropriate  Psych- Normal mood, good memory  Neck- No masses, no thyroid enlargement  Lymphatic- No palpable neck, axillary, or groin nodes  CV- Regular rhythm, no murmurs  Resp- CTA to bases, no wheezes  Abdomen- Soft, non distended, non tender, no masses  Breast left-  Bilaterally symmetrical, no masses, non tender, no nipple discharge  Breast right- Bilaterally symmetrical, no masses, non tender, no nipple discharge  External genitalia- Normal female, no lesions  Urethra/meatus- Normal, no masses, non tender, no prolapse  Bladder- Normal, no masses, non tender, no prolapse  Vagina- Normal, no atrophy, no " lesions, no discharge, no prolapse  Cvx- Normal, no lesions, no discharge, No cervical motion tenderness  Uterus- Normal size, shape & consistency.  Non tender, mobile.  Adnexa- No mass, non tender  Anus/Rectum/Perineum- Not performed  Ext- No edema, no cyanosis    Skin- No lesions, no rashes, no acanthosis nigricans    ASSESSMENT and PLAN:    Diagnoses and all orders for this visit:    1. Well woman exam (Primary)  -     IGP,rfx Aptima HPV All Pth      Preventative:   BREAST HEALTH- Monthly self breast exam importance and how to reviewed. MMG and/or MRI (prn) reviewed per society guidelines and her individual history. Screening Imaging: Not medically needed  CERVICAL CANCER Screening- Reviewed current ASCCP guidelines for screening w and wo cotest HPV, age specific.  Screen: Updated today  COLON CANCER Screening- Reviewed current medical society guidelines and options.  Screen:  Not medically needed  SEXUAL HEALTH: Declines STD screening,  Safe sex and condoms  BONE HEALTH- Reviewed current medical society guidelines and options for testing, calcium and vit D intake.  Weight bearing exercise.  DEXA: Not medically needed  VACCINATIONS Recommended: Up to date  Importance discussed, risk being unvaccinated reviewed.  Questions answered  Genetic testing: Counseled and evaluated for genetic testing.  Testing accepted.  Smoking status- NON SMOKER  Follow up PCP/Specialist PMHx and Labs  TRACK MENSES, RTO if <q21d, >7d long, heavy or painful.    All BIRTH CONTROL options R/B/A/SE/E of each reviewed in detail.  SAFE SEX/condoms importance reviewed.    Not interested in medical management contraception, desires to continue condom use at this time    Follow Up:  Return in about 1 year (around 6/7/2025).        Fabi Chance, REVA  06/07/2024

## 2024-06-07 ENCOUNTER — OFFICE VISIT (OUTPATIENT)
Dept: OBSTETRICS AND GYNECOLOGY | Facility: CLINIC | Age: 19
End: 2024-06-07
Payer: COMMERCIAL

## 2024-06-07 VITALS
SYSTOLIC BLOOD PRESSURE: 108 MMHG | WEIGHT: 112 LBS | DIASTOLIC BLOOD PRESSURE: 75 MMHG | HEART RATE: 85 BPM | BODY MASS INDEX: 21.14 KG/M2 | HEIGHT: 61 IN

## 2024-06-07 DIAGNOSIS — Z01.419 WELL WOMAN EXAM: Primary | ICD-10-CM

## 2024-06-07 PROCEDURE — G0123 SCREEN CERV/VAG THIN LAYER: HCPCS | Performed by: NURSE PRACTITIONER

## 2024-06-12 LAB
CONV .: NORMAL
CYTOLOGIST CVX/VAG CYTO: NORMAL
CYTOLOGY CVX/VAG DOC CYTO: NORMAL
CYTOLOGY CVX/VAG DOC THIN PREP: NORMAL
DX ICD CODE: NORMAL
Lab: NORMAL
Lab: NORMAL
OTHER STN SPEC: NORMAL
STAT OF ADQ CVX/VAG CYTO-IMP: NORMAL

## 2024-08-08 ENCOUNTER — TELEPHONE (OUTPATIENT)
Dept: FAMILY MEDICINE CLINIC | Facility: CLINIC | Age: 19
End: 2024-08-08

## 2024-08-08 DIAGNOSIS — F41.8 DEPRESSION WITH ANXIETY: ICD-10-CM

## 2024-08-08 NOTE — TELEPHONE ENCOUNTER
CHAD IS REQUESTING A REFILL ON HER ZOLOFT.  SHE IS TRYING TO GET HER INSURANCE STRAIGHTENED OUT.  RIGHT NOW SHE HAS ONE THAT WE DO NOT TAKE.  ONCE SHE GETS ANOTHER, SHE WILL MAKE AN APPOINTMENT. PLEASE SEND IN TO [facility] IN [place]. SHE DOESN'T WANT TO GO WITHOUT THE MEDS.

## 2024-08-17 DIAGNOSIS — F41.9 ANXIETY: ICD-10-CM

## 2024-08-19 RX ORDER — BUSPIRONE HYDROCHLORIDE 5 MG/1
5 TABLET ORAL 3 TIMES DAILY
Qty: 90 TABLET | Refills: 0 | Status: SHIPPED | OUTPATIENT
Start: 2024-08-19

## 2024-09-22 DIAGNOSIS — F41.8 DEPRESSION WITH ANXIETY: ICD-10-CM

## 2024-10-04 ENCOUNTER — OFFICE VISIT (OUTPATIENT)
Dept: FAMILY MEDICINE CLINIC | Facility: CLINIC | Age: 19
End: 2024-10-04
Payer: COMMERCIAL

## 2024-10-04 VITALS
SYSTOLIC BLOOD PRESSURE: 110 MMHG | BODY MASS INDEX: 19.45 KG/M2 | HEIGHT: 61 IN | HEART RATE: 104 BPM | OXYGEN SATURATION: 98 % | DIASTOLIC BLOOD PRESSURE: 70 MMHG | TEMPERATURE: 97.3 F | WEIGHT: 103 LBS

## 2024-10-04 DIAGNOSIS — R63.4 WEIGHT LOSS, NON-INTENTIONAL: ICD-10-CM

## 2024-10-04 DIAGNOSIS — Z23 IMMUNIZATION DUE: ICD-10-CM

## 2024-10-04 DIAGNOSIS — J30.9 ALLERGIC RHINITIS, UNSPECIFIED SEASONALITY, UNSPECIFIED TRIGGER: ICD-10-CM

## 2024-10-04 DIAGNOSIS — F41.8 DEPRESSION WITH ANXIETY: Primary | ICD-10-CM

## 2024-10-04 DIAGNOSIS — D50.9 IRON DEFICIENCY ANEMIA, UNSPECIFIED IRON DEFICIENCY ANEMIA TYPE: ICD-10-CM

## 2024-10-04 DIAGNOSIS — E53.8 FOLIC ACID DEFICIENCY: ICD-10-CM

## 2024-10-04 DIAGNOSIS — E55.9 VITAMIN D DEFICIENCY: ICD-10-CM

## 2024-10-04 LAB
25(OH)D3 SERPL-MCNC: 29.7 NG/ML (ref 30–100)
ALBUMIN SERPL-MCNC: 4.3 G/DL (ref 3.5–5.2)
ALBUMIN/GLOB SERPL: 1.3 G/DL
ALP SERPL-CCNC: 63 U/L (ref 39–117)
ALT SERPL W P-5'-P-CCNC: 10 U/L (ref 1–33)
ANION GAP SERPL CALCULATED.3IONS-SCNC: 12 MMOL/L (ref 5–15)
AST SERPL-CCNC: 18 U/L (ref 1–32)
BASOPHILS # BLD AUTO: 0.04 10*3/MM3 (ref 0–0.2)
BASOPHILS NFR BLD AUTO: 0.6 % (ref 0–1.5)
BILIRUB SERPL-MCNC: 0.4 MG/DL (ref 0–1.2)
BUN SERPL-MCNC: 9 MG/DL (ref 6–20)
BUN/CREAT SERPL: 12.5 (ref 7–25)
CALCIUM SPEC-SCNC: 9.6 MG/DL (ref 8.6–10.5)
CHLORIDE SERPL-SCNC: 103 MMOL/L (ref 98–107)
CHOLEST SERPL-MCNC: 171 MG/DL (ref 0–200)
CO2 SERPL-SCNC: 24 MMOL/L (ref 22–29)
CREAT SERPL-MCNC: 0.72 MG/DL (ref 0.57–1)
DEPRECATED RDW RBC AUTO: 37.9 FL (ref 37–54)
EGFRCR SERPLBLD CKD-EPI 2021: 122.2 ML/MIN/1.73
EOSINOPHIL # BLD AUTO: 0.11 10*3/MM3 (ref 0–0.4)
EOSINOPHIL NFR BLD AUTO: 1.7 % (ref 0.3–6.2)
ERYTHROCYTE [DISTWIDTH] IN BLOOD BY AUTOMATED COUNT: 12.4 % (ref 12.3–15.4)
FERRITIN SERPL-MCNC: 26.4 NG/ML (ref 13–150)
FOLATE SERPL-MCNC: 6.21 NG/ML (ref 4.78–24.2)
GLOBULIN UR ELPH-MCNC: 3.2 GM/DL
GLUCOSE SERPL-MCNC: 78 MG/DL (ref 65–99)
HBA1C MFR BLD: 5.4 % (ref 4.8–5.6)
HCT VFR BLD AUTO: 40.8 % (ref 34–46.6)
HDLC SERPL-MCNC: 67 MG/DL (ref 40–60)
HGB BLD-MCNC: 13.6 G/DL (ref 12–15.9)
IMM GRANULOCYTES # BLD AUTO: 0.02 10*3/MM3 (ref 0–0.05)
IMM GRANULOCYTES NFR BLD AUTO: 0.3 % (ref 0–0.5)
IRON 24H UR-MRATE: 68 MCG/DL (ref 37–145)
IRON SATN MFR SERPL: 14 % (ref 20–50)
LDLC SERPL CALC-MCNC: 94 MG/DL (ref 0–100)
LDLC/HDLC SERPL: 1.4 {RATIO}
LYMPHOCYTES # BLD AUTO: 1.29 10*3/MM3 (ref 0.7–3.1)
LYMPHOCYTES NFR BLD AUTO: 19.5 % (ref 19.6–45.3)
MCH RBC QN AUTO: 28.3 PG (ref 26.6–33)
MCHC RBC AUTO-ENTMCNC: 33.3 G/DL (ref 31.5–35.7)
MCV RBC AUTO: 84.8 FL (ref 79–97)
MONOCYTES # BLD AUTO: 0.54 10*3/MM3 (ref 0.1–0.9)
MONOCYTES NFR BLD AUTO: 8.2 % (ref 5–12)
NEUTROPHILS NFR BLD AUTO: 4.62 10*3/MM3 (ref 1.7–7)
NEUTROPHILS NFR BLD AUTO: 69.7 % (ref 42.7–76)
NRBC BLD AUTO-RTO: 0 /100 WBC (ref 0–0.2)
PLATELET # BLD AUTO: 314 10*3/MM3 (ref 140–450)
PMV BLD AUTO: 11.4 FL (ref 6–12)
POTASSIUM SERPL-SCNC: 4.4 MMOL/L (ref 3.5–5.2)
PROT SERPL-MCNC: 7.5 G/DL (ref 6–8.5)
RBC # BLD AUTO: 4.81 10*6/MM3 (ref 3.77–5.28)
SODIUM SERPL-SCNC: 139 MMOL/L (ref 136–145)
TIBC SERPL-MCNC: 495 MCG/DL (ref 298–536)
TRANSFERRIN SERPL-MCNC: 332 MG/DL (ref 200–360)
TRIGL SERPL-MCNC: 51 MG/DL (ref 0–150)
VIT B12 BLD-MCNC: 558 PG/ML (ref 211–946)
VLDLC SERPL-MCNC: 10 MG/DL (ref 5–40)
WBC NRBC COR # BLD AUTO: 6.62 10*3/MM3 (ref 3.4–10.8)

## 2024-10-04 PROCEDURE — 82746 ASSAY OF FOLIC ACID SERUM: CPT

## 2024-10-04 PROCEDURE — 83036 HEMOGLOBIN GLYCOSYLATED A1C: CPT

## 2024-10-04 PROCEDURE — 83540 ASSAY OF IRON: CPT

## 2024-10-04 PROCEDURE — 84466 ASSAY OF TRANSFERRIN: CPT

## 2024-10-04 PROCEDURE — 85025 COMPLETE CBC W/AUTO DIFF WBC: CPT

## 2024-10-04 PROCEDURE — 82607 VITAMIN B-12: CPT

## 2024-10-04 PROCEDURE — 82728 ASSAY OF FERRITIN: CPT

## 2024-10-04 PROCEDURE — 82306 VITAMIN D 25 HYDROXY: CPT

## 2024-10-04 PROCEDURE — 80053 COMPREHEN METABOLIC PANEL: CPT

## 2024-10-04 PROCEDURE — 80061 LIPID PANEL: CPT

## 2024-10-04 RX ORDER — CETIRIZINE HYDROCHLORIDE 10 MG/1
10 TABLET ORAL DAILY
Qty: 90 TABLET | Refills: 4 | Status: SHIPPED | OUTPATIENT
Start: 2024-10-04

## 2024-10-04 NOTE — PROGRESS NOTES
"Chief Complaint  Anxiety (Refills ) and Depression      History of Present Illness:  Jenifer Yeboah is a 19 y.o. female who presents to [facility] with a past medical history of  Past Medical History:   Diagnosis Date    Allergic     Anxiety     Asthma     Depression     GERD (gastroesophageal reflux disease)      Jenifer presents today for her medication refills.  She reports currently her anxiety and depression are under control.  She reports she did start a new job and it had been pretty stressful, but she feels like that that is getting better.  She reports she has an hour long drive to work each way and that adds to her stress.  She denies any thoughts of hurting herself or anyone else.  She denies any need for BuSpar at this time, she has not had to take supplemental.      She reports she did not have insurance for a long time so had stopped taking her daily supplemental medication.  She reports she is not sure if she needs to be back on that.    She was unaware that she had had that traumatic, weight loss, but she reports her normal weight and where she feels comfortable is around 115 pounds.  She reports she eats, but does have loose stool at times.  She reports she feels like that is getting better.  She denies any blood in her stool.  She does endorse lower abdominal cramps at times.  However, she feels like some of this is work/stress related.    She does report she has insurance now, so would like her labs checked as well.      Objective   Vital Signs:   Vitals:    10/04/24 0858   BP: 110/70   Pulse: 104   Temp: 97.3 °F (36.3 °C)   SpO2: 98%   Weight: 46.7 kg (103 lb)   Height: 154.9 cm (61\")     Body mass index is 19.46 kg/m².    Wt Readings from Last 3 Encounters:   10/04/24 46.7 kg (103 lb)   06/07/24 50.8 kg (112 lb)   03/29/24 54.4 kg (120 lb)     BP Readings from Last 3 Encounters:   10/04/24 110/70   06/07/24 108/75   03/29/24 117/65       Health Maintenance   Topic " Date Due    COVID-19 Vaccine (3 - 2023-24 season) 09/01/2024    ANNUAL PHYSICAL  02/09/2025    CHLAMYDIA SCREENING  03/29/2025    PAP SMEAR  06/07/2027    TDAP/TD VACCINES (3 - Td or Tdap) 02/09/2034    HEPATITIS C SCREENING  Completed    INFLUENZA VACCINE  Completed    MENINGOCOCCAL VACCINE  Completed    HPV VACCINES  Completed    Pneumococcal Vaccine 0-64  Aged Out       Review of Systems   Physical Exam  Vitals reviewed.   Constitutional:       Appearance: Normal appearance.   HENT:      Right Ear: Tympanic membrane normal.      Left Ear: Tympanic membrane normal.   Eyes:      Pupils: Pupils are equal, round, and reactive to light.   Cardiovascular:      Rate and Rhythm: Normal rate and regular rhythm.   Pulmonary:      Effort: Pulmonary effort is normal.      Breath sounds: Normal breath sounds.   Skin:     General: Skin is warm and dry.   Neurological:      General: No focal deficit present.      Mental Status: She is alert and oriented to person, place, and time.   Psychiatric:         Mood and Affect: Mood normal.            Result Review :  The following data was reviewed by: REVA Lowry on 10/04/2024:  No visits with results within 1 Month(s) from this visit.   Latest known visit with results is:   Office Visit on 06/07/2024   Component Date Value    Diagnosis 06/07/2024 Comment     Specimen adequacy: 06/07/2024 Comment     Clinician Provided ICD-1* 06/07/2024 Comment     Performed by: 06/07/2024 Comment     QC reviewed by: 06/07/2024 Comment     . 06/07/2024 .     Note: 06/07/2024 Comment     Method: 06/07/2024 Comment     Conv .conv 06/07/2024 Comment      Common labs          2/9/2024    11:08   Common Labs   Glucose 81    BUN 9    Creatinine 0.75    Sodium 139    Potassium 4.0    Chloride 103    Calcium 9.3    Albumin 4.2    Total Bilirubin 0.3    Alkaline Phosphatase 63    AST (SGOT) 19    ALT (SGPT) 7    WBC 5.26    Hemoglobin 12.8    Hematocrit 38.8    Platelets 316           Procedures         Assessment and Plan   Diagnoses and all orders for this visit:    1. Depression with anxiety (Primary)  Comments:  Patient doing well on current dose of Zoloft.  Denies SI/HI.  Denies any concerns.  Refill sent in.  Orders:  -     sertraline (ZOLOFT) 50 MG tablet; Take 1 tablet by mouth Daily.  Dispense: 30 tablet; Refill: 5    2. Immunization due  -     Fluzone >6mos    3. Allergic rhinitis, unspecified seasonality, unspecified trigger  Comments:  Refill sent in of daily allergy medicine.  Patient reports symptoms are controlled with medication.  Orders:  -     cetirizine (zyrTEC) 10 MG tablet; Take 1 tablet by mouth Daily.  Dispense: 90 tablet; Refill: 4    4. Vitamin D deficiency  -     Vitamin D,25-Hydroxy  -     Lipid Panel  -     Hemoglobin A1c  -     CBC Auto Differential    5. Folic acid deficiency  -     Iron Profile  -     Vitamin B12 & Folate  -     Ferritin  -     Lipid Panel  -     Hemoglobin A1c  -     CBC Auto Differential  -     Comprehensive Metabolic Panel    6. Iron deficiency anemia, unspecified iron deficiency anemia type  -     Iron Profile  -     Vitamin B12 & Folate  -     Ferritin  -     Lipid Panel  -     Hemoglobin A1c  -     CBC Auto Differential  -     Comprehensive Metabolic Panel    7. Weight loss, non-intentional        BMI is within normal parameters. No other follow-up for BMI required.     She is agreeable to influenza vaccine today, given in office.    Will refill depression and anxiety medication.  Did offer mental health nurse practitioner referral to patient.  She reports she wants to think about it.  She reports she will let me know if she desires this appointment.    Like to go ahead and get baseline labs and follow-up on her vitamin D deficiency as well as her folic acid deficiency.  Will determine if medications need to be resumed once labs are back.    Did speak with her about her weight loss.  I want her monitoring her weight weekly.  I would like for her to report her  weight results over the next several weeks.  Did talk to her about if she continues to have diarrhea, weight loss we would likely refer her to GI.  She was agreeable to that and will report her weights.    FOLLOW UP  Return in about 6 months (around 4/4/2025) for Refills, Recheck.    Patient was given instructions and counseling regarding her condition or for health maintenance advice. Please see specific information pulled into the AVS if appropriate.       Francy Dubonbriana, REVA  10/04/24  09:42 EDT    CURRENT & DISCONTINUED MEDICATIONS  Current Outpatient Medications   Medication Instructions    cetirizine (ZYRTEC) 10 mg, Oral, Daily    sertraline (ZOLOFT) 50 mg, Oral, Daily       Medications Discontinued During This Encounter   Medication Reason    APAP-Pamabrom-Pyrilamine (PAMPRIN MULTI-SYMPTOM PO) *Therapy completed    busPIRone (BUSPAR) 5 MG tablet *Therapy completed    Cholecalciferol (Vitamin D3) 50 MCG (2000 UT) tablet *Therapy completed    folic acid (FOLVITE) 1 MG tablet *Therapy completed    Multiple Vitamins-Minerals ([employer] DAILY GUMMIES PO) *Therapy completed    ondansetron (Zofran) 4 MG tablet *Therapy completed    Probiotic Product (Align) 4 MG capsule *Therapy completed    vitamin B-12 (CYANOCOBALAMIN) 100 MCG tablet *Therapy completed    cetirizine (zyrTEC) 10 MG tablet Reorder    sertraline (ZOLOFT) 50 MG tablet Reorder        EMR Dragon/Transcription disclaimer:  Parts of this encounter note are electronic transcription/translation of spoken language to printed text. The electronic translation of spoken language may permit erroneous, or at times, nonsensical words or phrases to be inadvertently transcribed. Although I have reviewed the note for such errors, some may still exist.

## 2024-10-04 NOTE — PROGRESS NOTES
Venipuncture Blood Specimen Collection  Venipuncture performed in left arm by Snehal Garrett with good hemostasis. Patient tolerated the procedure well without complications.   10/04/24   Jaylin Smith

## 2024-10-05 DIAGNOSIS — E53.8 FOLIC ACID DEFICIENCY: ICD-10-CM

## 2024-10-05 DIAGNOSIS — E55.9 VITAMIN D DEFICIENCY: Primary | ICD-10-CM

## 2024-10-05 RX ORDER — ACETAMINOPHEN 160 MG
2000 TABLET,DISINTEGRATING ORAL DAILY
Qty: 30 CAPSULE | Refills: 11 | Status: SHIPPED | OUTPATIENT
Start: 2024-10-05 | End: 2025-10-05

## 2024-10-05 RX ORDER — FOLIC ACID 1 MG/1
1 TABLET ORAL DAILY
Qty: 30 TABLET | Refills: 5 | Status: SHIPPED | OUTPATIENT
Start: 2024-10-05

## 2025-01-31 ENCOUNTER — OFFICE VISIT (OUTPATIENT)
Dept: FAMILY MEDICINE CLINIC | Facility: CLINIC | Age: 20
End: 2025-01-31
Payer: COMMERCIAL

## 2025-01-31 VITALS
BODY MASS INDEX: 18.88 KG/M2 | OXYGEN SATURATION: 99 % | HEIGHT: 61 IN | DIASTOLIC BLOOD PRESSURE: 60 MMHG | WEIGHT: 100 LBS | HEART RATE: 77 BPM | TEMPERATURE: 98.2 F | SYSTOLIC BLOOD PRESSURE: 102 MMHG

## 2025-01-31 DIAGNOSIS — R63.4 WEIGHT LOSS, NON-INTENTIONAL: ICD-10-CM

## 2025-01-31 DIAGNOSIS — R35.0 FREQUENT URINATION: Primary | ICD-10-CM

## 2025-01-31 DIAGNOSIS — N89.8 VAGINAL DISCHARGE: ICD-10-CM

## 2025-01-31 DIAGNOSIS — R10.84 GENERALIZED ABDOMINAL PAIN: ICD-10-CM

## 2025-01-31 LAB
BILIRUB BLD-MCNC: NEGATIVE MG/DL
CANDIDA SPECIES: NEGATIVE
CLARITY, POC: CLEAR
COLOR UR: YELLOW
EXPIRATION DATE: ABNORMAL
GARDNERELLA VAGINALIS: NEGATIVE
GLUCOSE UR STRIP-MCNC: NEGATIVE MG/DL
KETONES UR QL: ABNORMAL
LEUKOCYTE EST, POC: NEGATIVE
Lab: ABNORMAL
NITRITE UR-MCNC: NEGATIVE MG/ML
PH UR: 6 [PH] (ref 5–8)
PROT UR STRIP-MCNC: ABNORMAL MG/DL
RBC # UR STRIP: NEGATIVE /UL
SP GR UR: 1.02 (ref 1–1.03)
T VAGINALIS DNA VAG QL PROBE+SIG AMP: NEGATIVE
UROBILINOGEN UR QL: ABNORMAL

## 2025-01-31 PROCEDURE — 86003 ALLG SPEC IGE CRUDE XTRC EA: CPT

## 2025-01-31 PROCEDURE — 87510 GARDNER VAG DNA DIR PROBE: CPT

## 2025-01-31 PROCEDURE — 86258 DGP ANTIBODY EACH IG CLASS: CPT

## 2025-01-31 PROCEDURE — 99214 OFFICE O/P EST MOD 30 MIN: CPT

## 2025-01-31 PROCEDURE — 87660 TRICHOMONAS VAGIN DIR PROBE: CPT

## 2025-01-31 PROCEDURE — 87480 CANDIDA DNA DIR PROBE: CPT

## 2025-01-31 PROCEDURE — 86364 TISS TRNSGLTMNASE EA IG CLAS: CPT

## 2025-01-31 PROCEDURE — 81003 URINALYSIS AUTO W/O SCOPE: CPT

## 2025-01-31 PROCEDURE — 36415 COLL VENOUS BLD VENIPUNCTURE: CPT

## 2025-01-31 PROCEDURE — 82784 ASSAY IGA/IGD/IGG/IGM EACH: CPT

## 2025-01-31 PROCEDURE — 86231 EMA EACH IG CLASS: CPT

## 2025-01-31 PROCEDURE — 87086 URINE CULTURE/COLONY COUNT: CPT

## 2025-01-31 NOTE — PROGRESS NOTES
Venipuncture Blood Specimen Collection  Venipuncture performed in la by Anisa Ceja with good hemostasis. Patient tolerated the procedure well without complications.   01/31/25   Francy Lebron MA

## 2025-01-31 NOTE — PROGRESS NOTES
Chief Complaint  Urinary Tract Infection (Constant urination, urine always feels full, burning after urination, and lower left side of back pain, started about 2 weeks ago)    Little interest or pleasure in doing things? Not at all   Feeling down, depressed, or hopeless? Several days   PHQ-2 Total Score 1          History of Present Illness:  Jenifer Yeboah is a 19 y.o. female who presents to [facility] with a past medical history of  Past Medical History:   Diagnosis Date    Allergic     Anxiety     Asthma     Depression     GERD (gastroesophageal reflux disease)         History of Present Illness  The patient is a 19-year-old female who presents today with complaints of possible urinary tract infection, lower back pain, and unintentional weight loss.    She reports experiencing urinary issues, initially suspecting the onset of a urinary tract infection (UTI). Symptoms include persistent urge to urinate, a sensation of a full bladder, and the need to exert pressure during urination. She also describes a burning and irritating sensation that started today. She has been self-medicating with AZO for several days, which has provided some relief from associated lower back pain. She reports no history of kidney stones. Additionally, she notes an unusual faint odor in her vaginal area, despite maintaining regular hygiene practices, and reports the presence of discharge.    She has experienced a weight loss of 3 pounds since October 2024 and a total of 12 pounds since June 2024, which accounts for more than 10 percent of her body weight. She reports a decreased appetite, often feeling satiated after consuming only a few bites of food. She has attempted to eat more slowly, but her appetite remains inconsistent. On some days, she feels as though she could eat continuously, while on others, she struggles to finish a meal. Her bowel movements are irregular, ranging from daily to weekly, and  "are typically soft in consistency. She reports no instances of hard stools or constipation. She does not frequently consume milk but does include dairy products such as cheese in her diet. She suspects a potential intolerance to gluten, as she experiences bloating and swelling after consuming red meat.    MEDICATIONS  Current: AZO      Objective   Vital Signs:   Vitals:    01/31/25 1415   BP: 102/60   Pulse: 77   Temp: 98.2 °F (36.8 °C)   SpO2: 99%   Weight: 45.4 kg (100 lb)   Height: 154.9 cm (61\")     Body mass index is 18.89 kg/m².    Wt Readings from Last 3 Encounters:   01/31/25 45.4 kg (100 lb)   10/04/24 46.7 kg (103 lb)   06/07/24 50.8 kg (112 lb)     BP Readings from Last 3 Encounters:   01/31/25 102/60   10/04/24 110/70   06/07/24 108/75       Health Maintenance   Topic Date Due    COVID-19 Vaccine (3 - 2024-25 season) 01/31/2026 (Originally 9/1/2024)    ANNUAL PHYSICAL  02/09/2025    CHLAMYDIA SCREENING  01/31/2026    PAP SMEAR  06/07/2027    TDAP/TD VACCINES (3 - Td or Tdap) 02/09/2034    HEPATITIS C SCREENING  Completed    INFLUENZA VACCINE  Completed    MENINGOCOCCAL VACCINE  Completed    HPV VACCINES  Completed    Pneumococcal Vaccine 0-64  Aged Out       Review of Systems   Physical Exam  Vitals reviewed.   Constitutional:       Appearance: Normal appearance.   Eyes:      Pupils: Pupils are equal, round, and reactive to light.   Cardiovascular:      Rate and Rhythm: Normal rate and regular rhythm.   Pulmonary:      Effort: Pulmonary effort is normal.      Breath sounds: Normal breath sounds.   Abdominal:      Palpations: Abdomen is soft.      Tenderness: There is abdominal tenderness.   Skin:     General: Skin is warm and dry.   Neurological:      General: No focal deficit present.      Mental Status: She is alert and oriented to person, place, and time.   Psychiatric:         Mood and Affect: Mood normal.            Result Review :  The following data was reviewed by: REVA Lowry on " 01/31/2025:  Office Visit on 01/31/2025   Component Date Value    Color 01/31/2025 Yellow     Clarity, UA 01/31/2025 Clear     Specific Gravity  01/31/2025 1.020     pH, Urine 01/31/2025 6.0     Leukocytes 01/31/2025 Negative     Nitrite, UA 01/31/2025 Negative     Protein, POC 01/31/2025 Trace (A)     Glucose, UA 01/31/2025 Negative     Ketones, UA 01/31/2025 Trace (A)     Urobilinogen, UA 01/31/2025 0.2 E.U./dL     Bilirubin 01/31/2025 Negative     Blood, UA 01/31/2025 Negative     Lot Number 01/31/2025 [id number]     Expiration Date 01/31/[id number]     GARDNERELLA VAGINALIS 01/31/2025 Negative     TRICHOMONAS VAGINALIS 01/31/2025 Negative     CANDIDA SPECIES 01/31/2025 Negative        Results  Laboratory Studies  Urine test showed no abnormalities.      Procedures        Assessment and Plan   Diagnoses and all orders for this visit:    1. Frequent urination (Primary)  -     POCT urinalysis dipstick, automated  -     Urine Culture - Urine, Urine, Clean Catch    2. Vaginal discharge  -     Gardnerella vaginalis, Trichomonas vaginalis, Candida albicans, DNA - Swab, Vagina    3. Weight loss, non-intentional  -     Celiac Comprehensive Panel; Future  -     Ambulatory Referral to Gastroenterology  -     CT Abdomen Pelvis Stone Protocol  -     Food Allergy Profile  -     Celiac Comprehensive Panel    4. Generalized abdominal pain  -     CT Abdomen Pelvis Stone Protocol  -     Food Allergy Profile        Assessment & Plan  1. Potential urinary tract infection (UTI).  Her urine sample did not reveal any abnormalities, but the use of AZO may have influenced the results. A urine culture will be ordered for further analysis. A self-administered swab test will be conducted to rule out bacterial vaginosis and yeast infection, which can sometimes mimic UTI symptoms. She has been advised to discontinue the use of AZO and to contact the clinic if symptoms persist.    2. Unintentional weight loss.  She has experienced a  significant weight loss of 12 pounds since June 2024, accounting for more than 10 percent of her body weight. She has been advised to consume small, frequent meals throughout the day, specifically 5 to 6 times daily. An abdominal CT scan will be ordered. A referral to a gastroenterologist will be made. A celiac panel and food allergy profile will be conducted.      BMI is within normal parameters. No other follow-up for BMI required.         FOLLOW UP  Return if symptoms worsen or fail to improve.    Patient was given instructions and counseling regarding her condition or for health maintenance advice. Please see specific information pulled into the AVS if appropriate.       REVA Lowry  01/31/25  19:43 EST    CURRENT & DISCONTINUED MEDICATIONS  Current Outpatient Medications   Medication Instructions    cetirizine (ZYRTEC) 10 mg, Oral, Daily    folic acid (FOLVITE) 1 mg, Oral, Daily    sertraline (ZOLOFT) 50 mg, Oral, Daily    Vitamin D3 2,000 Units, Oral, Daily       There are no discontinued medications.     EMR Dragon/Transcription disclaimer:  Parts of this encounter note are electronic transcription/translation of spoken language to printed text.     Patient or patient representative verbalized consent for the use of Ambient Listening during the visit with  REVA Lowry for chart documentation. 1/31/2025  14:31 EST

## 2025-02-01 LAB — BACTERIA SPEC AEROBE CULT: NORMAL

## 2025-02-03 DIAGNOSIS — R35.0 FREQUENT URINATION: Primary | ICD-10-CM

## 2025-02-03 LAB
ENDOMYSIUM IGA SER QL: NEGATIVE
GLIADIN PEPTIDE IGA SER-ACNC: 6 UNITS (ref 0–19)
GLIADIN PEPTIDE IGG SER-ACNC: 2 UNITS (ref 0–19)
IGA SERPL-MCNC: 272 MG/DL (ref 87–352)
TTG IGA SER-ACNC: <2 U/ML (ref 0–3)
TTG IGG SER-ACNC: 4 U/ML (ref 0–5)

## 2025-02-03 RX ORDER — NITROFURANTOIN 25; 75 MG/1; MG/1
100 CAPSULE ORAL 2 TIMES DAILY
Qty: 14 CAPSULE | Refills: 0 | Status: SHIPPED | OUTPATIENT
Start: 2025-02-03

## 2025-02-04 LAB
CLAM IGE QN: 0.19 KU/L
CODFISH IGE QN: <0.1 KU/L
CONV CLASS DESCRIPTION: ABNORMAL
CORN IGE QN: 0.37 KU/L
COW MILK IGE QN: 0.37 KU/L
EGG WHITE IGE QN: 0.31 KU/L
PEANUT IGE QN: 0.49 KU/L
SCALLOP IGE QN: 0.23 KU/L
SESAME SEED IGE QN: 0.51 KU/L
SHRIMP IGE QN: 0.13 KU/L
SOYBEAN IGE QN: 0.35 KU/L
WALNUT IGE QN: 0.35 KU/L
WHEAT IGE QN: 0.75 KU/L

## 2025-04-02 ENCOUNTER — TELEPHONE (OUTPATIENT)
Dept: FAMILY MEDICINE CLINIC | Facility: CLINIC | Age: 20
End: 2025-04-02
Payer: COMMERCIAL

## 2025-04-02 DIAGNOSIS — J30.9 ALLERGIC RHINITIS, UNSPECIFIED SEASONALITY, UNSPECIFIED TRIGGER: ICD-10-CM

## 2025-04-02 DIAGNOSIS — F41.8 DEPRESSION WITH ANXIETY: ICD-10-CM

## 2025-04-02 RX ORDER — CETIRIZINE HYDROCHLORIDE 10 MG/1
10 TABLET ORAL DAILY
Qty: 90 TABLET | Refills: 1 | Status: SHIPPED | OUTPATIENT
Start: 2025-04-02 | End: 2025-04-02 | Stop reason: SDUPTHER

## 2025-04-02 RX ORDER — CETIRIZINE HYDROCHLORIDE 10 MG/1
10 TABLET ORAL DAILY
Qty: 90 TABLET | Refills: 1 | Status: SHIPPED | OUTPATIENT
Start: 2025-04-02 | End: 2025-09-29

## 2025-04-25 ENCOUNTER — OFFICE VISIT (OUTPATIENT)
Dept: FAMILY MEDICINE CLINIC | Facility: CLINIC | Age: 20
End: 2025-04-25
Payer: COMMERCIAL

## 2025-04-25 VITALS
TEMPERATURE: 98 F | OXYGEN SATURATION: 100 % | BODY MASS INDEX: 18.61 KG/M2 | HEIGHT: 61 IN | DIASTOLIC BLOOD PRESSURE: 62 MMHG | SYSTOLIC BLOOD PRESSURE: 102 MMHG | HEART RATE: 95 BPM | WEIGHT: 98.56 LBS

## 2025-04-25 DIAGNOSIS — K59.00 CONSTIPATION, UNSPECIFIED CONSTIPATION TYPE: ICD-10-CM

## 2025-04-25 DIAGNOSIS — R10.84 GENERALIZED ABDOMINAL PAIN: ICD-10-CM

## 2025-04-25 DIAGNOSIS — R19.7 DIARRHEA, UNSPECIFIED TYPE: ICD-10-CM

## 2025-04-25 DIAGNOSIS — R61 NIGHT SWEATS: ICD-10-CM

## 2025-04-25 DIAGNOSIS — Z91.018 MULTIPLE FOOD ALLERGIES: ICD-10-CM

## 2025-04-25 DIAGNOSIS — R63.4 WEIGHT LOSS, NON-INTENTIONAL: Primary | ICD-10-CM

## 2025-04-25 LAB
ALBUMIN SERPL-MCNC: 4.3 G/DL (ref 3.5–5.2)
ALBUMIN/GLOB SERPL: 1.2 G/DL
ALP SERPL-CCNC: 60 U/L (ref 39–117)
ALT SERPL W P-5'-P-CCNC: 17 U/L (ref 1–33)
ANION GAP SERPL CALCULATED.3IONS-SCNC: 12.7 MMOL/L (ref 5–15)
AST SERPL-CCNC: 26 U/L (ref 1–32)
BASOPHILS # BLD AUTO: 0.02 10*3/MM3 (ref 0–0.2)
BASOPHILS NFR BLD AUTO: 0.3 % (ref 0–1.5)
BILIRUB SERPL-MCNC: 0.3 MG/DL (ref 0–1.2)
BUN SERPL-MCNC: 10 MG/DL (ref 6–20)
BUN/CREAT SERPL: 15.6 (ref 7–25)
CALCIUM SPEC-SCNC: 9.5 MG/DL (ref 8.6–10.5)
CHLORIDE SERPL-SCNC: 103 MMOL/L (ref 98–107)
CO2 SERPL-SCNC: 23.3 MMOL/L (ref 22–29)
CREAT SERPL-MCNC: 0.64 MG/DL (ref 0.57–1)
CRP SERPL-MCNC: <0.3 MG/DL (ref 0–0.5)
DEPRECATED RDW RBC AUTO: 39.6 FL (ref 37–54)
EGFRCR SERPLBLD CKD-EPI 2021: 128.3 ML/MIN/1.73
EOSINOPHIL # BLD AUTO: 0.09 10*3/MM3 (ref 0–0.4)
EOSINOPHIL NFR BLD AUTO: 1.4 % (ref 0.3–6.2)
ERYTHROCYTE [DISTWIDTH] IN BLOOD BY AUTOMATED COUNT: 12.8 % (ref 12.3–15.4)
GLOBULIN UR ELPH-MCNC: 3.7 GM/DL
GLUCOSE SERPL-MCNC: 80 MG/DL (ref 65–99)
HCT VFR BLD AUTO: 39.5 % (ref 34–46.6)
HGB BLD-MCNC: 13.1 G/DL (ref 12–15.9)
IMM GRANULOCYTES # BLD AUTO: 0.01 10*3/MM3 (ref 0–0.05)
IMM GRANULOCYTES NFR BLD AUTO: 0.2 % (ref 0–0.5)
LYMPHOCYTES # BLD AUTO: 1.26 10*3/MM3 (ref 0.7–3.1)
LYMPHOCYTES NFR BLD AUTO: 20.2 % (ref 19.6–45.3)
MCH RBC QN AUTO: 28.4 PG (ref 26.6–33)
MCHC RBC AUTO-ENTMCNC: 33.2 G/DL (ref 31.5–35.7)
MCV RBC AUTO: 85.5 FL (ref 79–97)
MONOCYTES # BLD AUTO: 0.46 10*3/MM3 (ref 0.1–0.9)
MONOCYTES NFR BLD AUTO: 7.4 % (ref 5–12)
NEUTROPHILS NFR BLD AUTO: 4.41 10*3/MM3 (ref 1.7–7)
NEUTROPHILS NFR BLD AUTO: 70.5 % (ref 42.7–76)
NRBC BLD AUTO-RTO: 0 /100 WBC (ref 0–0.2)
PLATELET # BLD AUTO: 273 10*3/MM3 (ref 140–450)
PMV BLD AUTO: 11.2 FL (ref 6–12)
POTASSIUM SERPL-SCNC: 3.8 MMOL/L (ref 3.5–5.2)
PROT SERPL-MCNC: 8 G/DL (ref 6–8.5)
RBC # BLD AUTO: 4.62 10*6/MM3 (ref 3.77–5.28)
SODIUM SERPL-SCNC: 139 MMOL/L (ref 136–145)
T4 FREE SERPL-MCNC: 1.19 NG/DL (ref 0.92–1.68)
TSH SERPL DL<=0.05 MIU/L-ACNC: 0.79 UIU/ML (ref 0.27–4.2)
WBC NRBC COR # BLD AUTO: 6.25 10*3/MM3 (ref 3.4–10.8)

## 2025-04-25 PROCEDURE — 82785 ASSAY OF IGE: CPT

## 2025-04-25 PROCEDURE — 86376 MICROSOMAL ANTIBODY EACH: CPT

## 2025-04-25 PROCEDURE — 36415 COLL VENOUS BLD VENIPUNCTURE: CPT

## 2025-04-25 PROCEDURE — 86140 C-REACTIVE PROTEIN: CPT

## 2025-04-25 PROCEDURE — 84443 ASSAY THYROID STIM HORMONE: CPT

## 2025-04-25 PROCEDURE — 86008 ALLG SPEC IGE RECOMB EA: CPT

## 2025-04-25 PROCEDURE — 84681 ASSAY OF C-PEPTIDE: CPT

## 2025-04-25 PROCEDURE — 86003 ALLG SPEC IGE CRUDE XTRC EA: CPT

## 2025-04-25 PROCEDURE — 83525 ASSAY OF INSULIN: CPT

## 2025-04-25 PROCEDURE — 80053 COMPREHEN METABOLIC PANEL: CPT

## 2025-04-25 PROCEDURE — 85025 COMPLETE CBC W/AUTO DIFF WBC: CPT

## 2025-04-25 PROCEDURE — 84439 ASSAY OF FREE THYROXINE: CPT

## 2025-04-25 PROCEDURE — 99214 OFFICE O/P EST MOD 30 MIN: CPT

## 2025-04-25 NOTE — PROGRESS NOTES
Venipuncture Blood Specimen Collection  Venipuncture performed  by Anisa Ceja with good hemostasis. Patient tolerated the procedure well without complications.   04/25/25   Sarah Hernandez MA

## 2025-04-25 NOTE — PROGRESS NOTES
Chief Complaint  Depression (6 month follow up to Sertraline)    The PHQ has not been completed during this encounter.         History of Present Illness:  Jenifer Yeboah is a 20 y.o. female who presents to [facility] with a past medical history of  Past Medical History:   Diagnosis Date    Allergic     Anxiety     Asthma     Depression     GERD (gastroesophageal reflux disease)         History of Present Illness  The patient is a 20-year-old female who presents today for a 6-month follow-up.    She has been experiencing weight loss since the fall, which has continued into the winter, despite maintaining a consistent diet of three meals a day with snacks in between. This weight loss is concerning to her as she is eating more consistently than before. No nausea or vomiting is reported, but she notes irregular bowel movements, alternating between soft and hard stools. Recently, she has developed hemorrhoids. She has not yet scheduled her abdominal and pelvic scan due to financial constraints and has not followed up with the GI referral. There is no family history of Crohn's disease. She is on Medicaid but pays a portion of the cost.    A history of food allergies is noted, with moderate sensitivity to wheat and low sensitivity to other foods. She experiences swelling and tightness after consuming bread but does not drink milk. She has not consulted an allergist.    Since February, she has been experiencing severe night sweats, despite sleeping in a cold environment. This occurs daily, and she reports no feeling of heat during the night. There is no abnormal sexual history or risk for HIV, and she has been in a monogamous relationship for the past 5 years. She is not on any estrogen-based birth control. Additionally, she reports poor sleep quality, with frequent awakenings at night and difficulty achieving deep sleep. No excessive thirst or bedwetting is reported.     She continues to  "take Zoloft, which she switched to morning administration due to forgetfulness at night. No daytime fatigue from Zoloft is experienced. She has been on Zoloft since her freshman year of high school, with a dosage increase from 25 to 50 mg approximately a year ago.    Her vaginal discharge has improved after switching to Dove sensitive skin soap.    SOCIAL HISTORY  She works as a .    FAMILY HISTORY  She has no family history of Crohn's disease.      Objective   Vital Signs:   Vitals:    04/25/25 0910   BP: 102/62   Pulse: 95   Temp: 98 °F (36.7 °C)   SpO2: 100%   Weight: 44.7 kg (98 lb 9 oz)   Height: 154.9 cm (61\")     Body mass index is 18.62 kg/m².    Wt Readings from Last 3 Encounters:   04/25/25 44.7 kg (98 lb 9 oz)   01/31/25 45.4 kg (100 lb)   10/04/24 46.7 kg (103 lb)     BP Readings from Last 3 Encounters:   04/25/25 102/62   01/31/25 102/60   10/04/24 110/70       Health Maintenance   Topic Date Due    MENINGOCOCCAL B VACCINE (1 of 2 - Standard) Never done    ANNUAL PHYSICAL  02/09/2025    COVID-19 Vaccine (3 - 2024-25 season) 01/31/2026 (Originally 9/1/2024)    INFLUENZA VACCINE  07/01/2025    CHLAMYDIA SCREENING  01/31/2026    PAP SMEAR  06/07/2027    TDAP/TD VACCINES (3 - Td or Tdap) 02/09/2034    HEPATITIS C SCREENING  Completed    MENINGOCOCCAL VACCINE  Completed    HPV VACCINES  Completed    Pneumococcal Vaccine 0-49  Aged Out       Review of Systems   Physical Exam  Vitals reviewed.   Constitutional:       Appearance: Normal appearance.   Eyes:      Pupils: Pupils are equal, round, and reactive to light.   Cardiovascular:      Rate and Rhythm: Normal rate and regular rhythm.   Pulmonary:      Effort: Pulmonary effort is normal.      Breath sounds: Normal breath sounds.   Skin:     General: Skin is warm and dry.   Neurological:      General: No focal deficit present.      Mental Status: She is alert and oriented to person, place, and time.   Psychiatric:         Mood and Affect: Mood " normal.            Result Review :  The following data was reviewed by: REVA Lowry on 04/25/2025:  No visits with results within 1 Month(s) from this visit.   Latest known visit with results is:   Office Visit on 01/31/2025   Component Date Value    Color 01/31/2025 Yellow     Clarity, UA 01/31/2025 Clear     Specific Gravity  01/31/2025 1.020     pH, Urine 01/31/2025 6.0     Leukocytes 01/31/2025 Negative     Nitrite, UA 01/31/2025 Negative     Protein, POC 01/31/2025 Trace (A)     Glucose, UA 01/31/2025 Negative     Ketones, UA 01/31/2025 Trace (A)     Urobilinogen, UA 01/31/2025 0.2 E.U./dL     Bilirubin 01/31/2025 Negative     Blood, UA 01/31/2025 Negative     Lot Number 01/31/2025 [id number]     Expiration Date 01/31/[phone]     Urine Culture 01/31/2025 25,000 CFU/mL Mixed Jennifer Isolated     GARDNERELLA VAGINALIS 01/31/2025 Negative     TRICHOMONAS VAGINALIS 01/31/2025 Negative     CANDIDA SPECIES 01/31/2025 Negative     Class Description 01/31/2025 Comment     Egg White 01/31/2025 0.31 (A)     Peanut 01/31/2025 0.49 (A)     Soybean 01/31/2025 0.35 (A)     Milk, Cow's 01/31/2025 0.37 (A)     Clams 01/31/2025 0.19 (A)     Shrimp 01/31/2025 0.13 (A)     Weatherby 01/31/2025 0.35 (A)     CodFish 01/31/2025 <0.10     Scallop 01/31/2025 0.23 (A)     Wheat 01/31/2025 0.75 (A)     Corn 01/31/2025 0.37 (A)     Sesame Seed 01/31/2025 0.51 (A)     Gliadin Deamidated Pepti* 01/31/2025 6     Deaminated Gliadin Ab IgG 01/31/2025 2     Tissue Transglutaminase * 01/31/2025 <2     Tissue Transglutaminase * 01/31/2025 4     Endomysial IgA 01/31/2025 Negative     IgA 01/31/2025 272        Results  Labs   - Celiac test: Normal   - Food allergies: 02/2025, Low except for moderate in wheat   - A1c: Normal   - Iron profile: Normal      Procedures        Assessment and Plan   Diagnoses and all orders for this visit:    1. Weight loss, non-intentional (Primary)  -     Ambulatory Referral to Gastroenterology  -      Alpha-Gal IgE Panel  -     Insulin & C-Peptide, Serum  -     CBC Auto Differential  -     C-reactive protein  -     Comprehensive Metabolic Panel    2. Generalized abdominal pain  -     Ambulatory Referral to Gastroenterology  -     Alpha-Gal IgE Panel    3. Night sweats  -     Thyroid Peroxidase Antibody  -     TSH+Free T4  -     Insulin & C-Peptide, Serum  -     CBC Auto Differential  -     C-reactive protein  -     Comprehensive Metabolic Panel    4. Multiple food allergies  -     Ambulatory Referral to Allergy    5. Diarrhea, unspecified type    6. Constipation, unspecified constipation type        Assessment & Plan  1. Unintentional weight loss.  - Continues to experience unintentional weight loss despite maintaining a consistent diet.  - Has not yet scheduled her abdominal and pelvic scan due to financial constraints.  - An urgent referral to gastroenterology will be initiated for further evaluation, including the possibility of scopes and biopsies.  - Blood work will be ordered to assess thyroid function, insulin levels, C-peptide, CBC, and CMP.    2. Constipation.  - Reports irregular bowel movements and recent development of hemorrhoids.  - Physical exam findings indicate back and forth from soft to solid stools.  - Advised to use MiraLAX for constipation management.    3. Night sweats.  - Reports experiencing night sweats since 02/2025.  - Blood work will be ordered to assess thyroid function and other relevant parameters.  - Discussion included checking for potential causes such as thyroid issues and insulin levels.    4. Food allergies.  - Moderate wheat allergies and low levels of other food allergies identified.  - Referral to an allergist will be made for further evaluation and management.  - An alpha-gal test will be conducted to rule out potential allergies related to gastrointestinal symptoms.    5. Medication management.  - Currently taking Zoloft 50 mg in the morning and vitamin D supplements.  -  No changes to the current medication regimen are recommended at this time.    Follow-up  The patient will follow up in 3 months for her wellness visit.      BMI is within normal parameters. No other follow-up for BMI required.         FOLLOW UP  Return in 3 months (on 7/25/2025) for Annual physical.    Patient was given instructions and counseling regarding her condition or for health maintenance advice. Please see specific information pulled into the AVS if appropriate.       REVA Lowry  04/25/25  15:26 EDT    CURRENT & DISCONTINUED MEDICATIONS  Current Outpatient Medications   Medication Instructions    cetirizine (ZYRTEC) 10 mg, Oral, Daily    folic acid (FOLVITE) 1 mg, Oral, Daily    sertraline (ZOLOFT) 50 mg, Oral, Daily    Vitamin D3 2,000 Units, Oral, Daily       Medications Discontinued During This Encounter   Medication Reason    nitrofurantoin, macrocrystal-monohydrate, (Macrobid) 100 MG capsule *Therapy completed        EMR Dragon/Transcription disclaimer:  Parts of this encounter note are electronic transcription/translation of spoken language to printed text.     Patient or patient representative verbalized consent for the use of Ambient Listening during the visit with  REVA Lowry for chart documentation. 4/25/2025  10:05 EDT

## 2025-04-26 LAB
C PEPTIDE SERPL-MCNC: 1.5 NG/ML (ref 1.1–4.4)
INSULIN SERPL-ACNC: 5.5 UIU/ML (ref 2.6–24.9)
THYROPEROXIDASE AB SERPL-ACNC: 19 IU/ML (ref 0–34)

## 2025-04-30 LAB
ALPHA-GAL IGE QN: <0.1 KU/L
BEEF IGE QN: <0.1 KU/L
CONV CLASS DESCRIPTION: ABNORMAL
IGE SERPL-ACNC: 416 IU/ML (ref 6–495)
LAMB IGE QN: 0.3 KU/L
PORK IGE QN: 0.66 KU/L

## 2025-07-25 ENCOUNTER — PATIENT ROUNDING (BHMG ONLY) (OUTPATIENT)
Dept: GASTROENTEROLOGY | Facility: CLINIC | Age: 20
End: 2025-07-25
Payer: COMMERCIAL

## 2025-07-25 ENCOUNTER — OFFICE VISIT (OUTPATIENT)
Dept: GASTROENTEROLOGY | Facility: CLINIC | Age: 20
End: 2025-07-25
Payer: COMMERCIAL

## 2025-07-25 VITALS
WEIGHT: 102 LBS | HEIGHT: 61 IN | SYSTOLIC BLOOD PRESSURE: 108 MMHG | OXYGEN SATURATION: 100 % | RESPIRATION RATE: 16 BRPM | DIASTOLIC BLOOD PRESSURE: 79 MMHG | HEART RATE: 75 BPM | BODY MASS INDEX: 19.26 KG/M2

## 2025-07-25 DIAGNOSIS — R63.4 WEIGHT LOSS: ICD-10-CM

## 2025-07-25 DIAGNOSIS — K62.5 RECTAL BLEEDING: ICD-10-CM

## 2025-07-25 DIAGNOSIS — K59.00 CONSTIPATION, UNSPECIFIED CONSTIPATION TYPE: ICD-10-CM

## 2025-07-25 DIAGNOSIS — R11.2 NAUSEA AND VOMITING, UNSPECIFIED VOMITING TYPE: Primary | ICD-10-CM

## 2025-07-25 DIAGNOSIS — R68.81 EARLY SATIETY: ICD-10-CM

## 2025-07-25 DIAGNOSIS — R14.0 BLOATING: ICD-10-CM

## 2025-07-25 DIAGNOSIS — R19.7 DIARRHEA, UNSPECIFIED TYPE: ICD-10-CM

## 2025-07-25 RX ORDER — PANTOPRAZOLE SODIUM 40 MG/1
40 TABLET, DELAYED RELEASE ORAL DAILY
Qty: 30 TABLET | Refills: 5 | Status: SHIPPED | OUTPATIENT
Start: 2025-07-25

## 2025-07-25 RX ORDER — SACCHAROMYCES BOULARDII 250 MG
250 CAPSULE ORAL 2 TIMES DAILY
Qty: 60 CAPSULE | Refills: 5 | Status: SHIPPED | OUTPATIENT
Start: 2025-07-25

## 2025-07-25 RX ORDER — POLYETHYLENE GLYCOL 3350, SODIUM SULFATE, SODIUM CHLORIDE, POTASSIUM CHLORIDE, ASCORBIC ACID, SODIUM ASCORBATE 140-9-5.2G
1 KIT ORAL TAKE AS DIRECTED
Qty: 1 EACH | Refills: 0 | Status: SHIPPED | OUTPATIENT
Start: 2025-07-25 | End: 2025-07-26

## 2025-07-25 NOTE — PROGRESS NOTES
Chief Complaint  Establish Care (Weight loss, non-intentional, Generalized abdominal pain. Patient states her weight loss has came out of no where she can't gain anything she keeps losing. Patient sates she has lost 32 ponds so far. Patient also  sates that on min she os constipated the next minute she has bad diarrhea. Patient also states for a year she has been having real bad night sweats. Patient states when she eats or drink anything she gets bloated real bad to where her stomach feels real hard. /)    Jenifer Yeboah is a 20 y.o. female who presents to [facility] on referral from REVA Lowry for a gastroenterology evaluation of weight loss, abdominal pain, and altered bowel habits.        History of Present Illness  New patient presents to the office for weight loss, abdominal pain, and altered bowel habits. Weight loss of about 32 pound since December 2023. She has a good appetite but is full very fast. A lot of bloating after most meals. Nausea worse in the mornings, denies vomiting. Bowel habits alternate with constipation followed by diarrhea. Does have rectal bleeding thought to be related to hemorrhoids. GI symptoms worse around menstrual cycle.     Past Medical History:   Diagnosis Date    Allergic     Anxiety     Asthma     Depression     GERD (gastroesophageal reflux disease)        History reviewed. No pertinent surgical history.      Current Outpatient Medications:     cetirizine (zyrTEC) 10 MG tablet, Take 1 tablet by mouth Daily for 180 days., Disp: 90 tablet, Rfl: 1    Cholecalciferol (Vitamin D3) 50 MCG (2000 UT) capsule, Take 1 capsule by mouth Daily., Disp: 30 capsule, Rfl: 11    folic acid (FOLVITE) 1 MG tablet, Take 1 tablet by mouth Daily., Disp: 30 tablet, Rfl: 5    sertraline (ZOLOFT) 50 MG tablet, Take 1 tablet by mouth Daily for 180 days., Disp: 90 tablet, Rfl: 1    pantoprazole (Protonix) 40 MG EC tablet, Take 1 tablet by mouth Daily.,  Disp: 30 tablet, Rfl: 5    PEG-KCl-NaCl-NaSulf-Na Asc-C (Plenvu) 140 g reconstituted solution solution, Take 140 g by mouth Take As Directed for 1 day. Attn: Pharmacist: please see notes for coupon code., Disp: 1 each, Rfl: 0    saccharomyces boulardii (Florastor) 250 MG capsule, Take 1 capsule by mouth 2 (Two) Times a Day., Disp: 60 capsule, Rfl: 5     Allergies   Allergen Reactions    Sulfamethoxazole-Trimethoprim Hives    Naproxen Confusion     Started studerring       Family History   Problem Relation Age of Onset    Depression Father     Anxiety disorder Father     Depression Mother     Anxiety disorder Mother     Heart disease Sister     Diabetes Sister     Ovarian cancer Paternal Grandmother     COPD Paternal Grandmother     Lung disease Paternal Grandmother     Breast cancer Neg Hx     Uterine cancer Neg Hx     Colon cancer Neg Hx     Deep vein thrombosis Neg Hx     Pulmonary embolism Neg Hx         Social History     Social History Narrative    Not on file       Immunization:  Immunization History   Administered Date(s) Administered    31-influenza Vac Quardvalent Preservativ 10/20/2021, 01/06/2023    COVID-19 (MODERNA) 1st,2nd,3rd Dose Monovalent 08/06/2021, 08/31/2021    DTaP 2003, 2003, 2003, 01/04/2005, 04/04/2007    DTaP, Unspecified 2003, 2003, 2003, 01/04/2005, 04/04/2007    Flu Vaccine Quad PF >36MO 10/18/2018, 10/15/2019, 10/19/2020    Flu Vaccine Split Quad 10/18/2018, 10/15/2019, 10/19/2020    FluMist 2-49yrs 11/18/2014, 10/06/2015    FluMist 2-49yrs (Nasal) 10/09/2009, 01/14/2011, 10/10/2011, 12/10/2012, 10/12/2013, 11/18/2014, 10/06/2015    Fluzone  >6mos 10/04/2024    Fluzone (or Fluarix & Flulaval for VFC) >6mos 10/18/2018, 10/15/2019, 10/19/2020, 10/20/2021, 01/06/2023    HPV Quadrivalent 08/04/2015    Hep A, 2 Dose 01/29/2014, 08/04/2015    Hep B, Adolescent or Pediatric 2003, 2003, 2003    Hep B, Unspecified 2003, 2003,  "2003    HiB 2003, 2003, 2003, 01/04/2005    Hib (PRP-OMP) 2003, 2003, 2003, 01/04/2005    Hpv9 10/06/2015, 04/06/2016    IPV 2003, 2003, 2003, 04/04/2007    Influenza, Unspecified 10/18/2018, 10/15/2019, 10/19/2020    MCV4 Unspecified 01/29/2014    MMR 02/23/2004, 04/04/2007    Meningococcal MCV4P (Menactra) 01/29/2014, 01/29/2014, 02/19/2019    PEDS-Pneumococcal Conjugate (PCV7) 2003, 2003, 2003, 01/04/2005    Pneumococcal, Unspecified 2003, 2003, 2003, 01/04/2005    Polio, Unspecified 2003, 2003, 2003, 04/04/2007    Tdap 01/29/2014, 02/09/2024    Varicella 02/23/2004, 04/04/2007        Objective     Vital Signs:   /79   Pulse 75   Resp 16   Ht 154.9 cm (60.98\")   Wt 46.3 kg (102 lb)   SpO2 100%   BMI 19.28 kg/m²       Physical Exam  Constitutional:       Appearance: Normal appearance. She is normal weight.   HENT:      Head: Normocephalic and atraumatic.      Nose: Nose normal.   Pulmonary:      Effort: Pulmonary effort is normal.   Skin:     General: Skin is warm and dry.   Neurological:      Mental Status: She is alert and oriented to person, place, and time. Mental status is at baseline.   Psychiatric:         Mood and Affect: Mood normal.         Behavior: Behavior normal.         Thought Content: Thought content normal.         Judgment: Judgment normal.         Result Review :     CBC w/diff          10/4/2024    09:33 4/25/2025    10:08   CBC w/Diff   WBC 6.62  6.25    RBC 4.81  4.62    Hemoglobin 13.6  13.1    Hematocrit 40.8  39.5    MCV 84.8  85.5    MCH 28.3  28.4    MCHC 33.3  33.2    RDW 12.4  12.8    Platelets 314  273    Neutrophil Rel % 69.7  70.5    Immature Granulocyte Rel % 0.3  0.2    Lymphocyte Rel % 19.5  20.2    Monocyte Rel % 8.2  7.4    Eosinophil Rel % 1.7  1.4    Basophil Rel % 0.6  0.3      CMP          10/4/2024    09:33 4/25/2025    10:08   CMP   Glucose 78  80 "    BUN 9  10    Creatinine 0.72  0.64    EGFR 122.2  128.3    Sodium 139  139    Potassium 4.4  3.8    Chloride 103  103    Calcium 9.6  9.5    Total Protein 7.5  8.0    Albumin 4.3  4.3    Globulin 3.2  3.7    Total Bilirubin 0.4  0.3    Alkaline Phosphatase 63  60    AST (SGOT) 18  26    ALT (SGPT) 10  17    Albumin/Globulin Ratio 1.3  1.2    BUN/Creatinine Ratio 12.5  15.6    Anion Gap 12.0  12.7              Assessment and Plan    Diagnoses and all orders for this visit:    1. Nausea and vomiting, unspecified vomiting type (Primary)  -     Case Request; Standing  -     Implement Anesthesia orders day of procedure.; Standing  -     Follow Anesthesia Guidelines / Protocol; Future  -     Verify NPO; Standing  -     Verify bowel prep was successful; Standing  -     Give tap water enema if bowel prep was insufficient; Standing  -     Obtain Informed Consent; Standing  -     Case Request    2. Early satiety  -     Case Request; Standing  -     Implement Anesthesia orders day of procedure.; Standing  -     Follow Anesthesia Guidelines / Protocol; Future  -     Verify NPO; Standing  -     Verify bowel prep was successful; Standing  -     Give tap water enema if bowel prep was insufficient; Standing  -     Obtain Informed Consent; Standing  -     Case Request    3. Weight loss  -     Case Request; Standing  -     Implement Anesthesia orders day of procedure.; Standing  -     Follow Anesthesia Guidelines / Protocol; Future  -     Verify NPO; Standing  -     Verify bowel prep was successful; Standing  -     Give tap water enema if bowel prep was insufficient; Standing  -     Obtain Informed Consent; Standing  -     Case Request    4. Bloating  -     Case Request; Standing  -     Implement Anesthesia orders day of procedure.; Standing  -     Follow Anesthesia Guidelines / Protocol; Future  -     Verify NPO; Standing  -     Verify bowel prep was successful; Standing  -     Give tap water enema if bowel prep was insufficient;  Standing  -     Obtain Informed Consent; Standing  -     Case Request    5. Constipation, unspecified constipation type  -     Case Request; Standing  -     Implement Anesthesia orders day of procedure.; Standing  -     Follow Anesthesia Guidelines / Protocol; Future  -     Verify NPO; Standing  -     Verify bowel prep was successful; Standing  -     Give tap water enema if bowel prep was insufficient; Standing  -     Obtain Informed Consent; Standing  -     Case Request    6. Diarrhea, unspecified type  -     Case Request; Standing  -     Implement Anesthesia orders day of procedure.; Standing  -     Follow Anesthesia Guidelines / Protocol; Future  -     Verify NPO; Standing  -     Verify bowel prep was successful; Standing  -     Give tap water enema if bowel prep was insufficient; Standing  -     Obtain Informed Consent; Standing  -     Case Request    7. Rectal bleeding  -     Case Request; Standing  -     Implement Anesthesia orders day of procedure.; Standing  -     Follow Anesthesia Guidelines / Protocol; Future  -     Verify NPO; Standing  -     Verify bowel prep was successful; Standing  -     Give tap water enema if bowel prep was insufficient; Standing  -     Obtain Informed Consent; Standing  -     Case Request    Other orders  -     pantoprazole (Protonix) 40 MG EC tablet; Take 1 tablet by mouth Daily.  Dispense: 30 tablet; Refill: 5  -     saccharomyces boulardii (Florastor) 250 MG capsule; Take 1 capsule by mouth 2 (Two) Times a Day.  Dispense: 60 capsule; Refill: 5  -     PEG-KCl-NaCl-NaSulf-Na Asc-C (Plenvu) 140 g reconstituted solution solution; Take 140 g by mouth Take As Directed for 1 day. Attn: Pharmacist: please see notes for coupon code.  Dispense: 1 each; Refill: 0    Start Protonix 40mg daily  Start Probiotic  Denies cardiopulmonary history  EGD/COLONOSCOPY Surgical Risk and Benefits: Possible risk/complications, benefits, and alternatives to surgical or invasive procedure have been  explained to patient and/or legal guardian. Risks include bleeding, infection, and perforation. Patient has been evaluated and can tolerate anesthesia and/or sedation. Risk, benefits, and alternatives to anesthesia and sedation have been explained to patient and/or legal guardian.   Advised patient to make apt with GYN to consider pelvic ultrasound    Follow Up   No follow-ups on file.  Patient was given instructions and counseling regarding her condition or for health maintenance advice. Please see specific information pulled into the AVS if appropriate.

## 2025-07-31 ENCOUNTER — TELEPHONE (OUTPATIENT)
Dept: GASTROENTEROLOGY | Facility: CLINIC | Age: 20
End: 2025-07-31
Payer: COMMERCIAL

## 2025-08-08 ENCOUNTER — OFFICE VISIT (OUTPATIENT)
Dept: FAMILY MEDICINE CLINIC | Facility: CLINIC | Age: 20
End: 2025-08-08
Payer: COMMERCIAL

## 2025-08-08 VITALS
HEART RATE: 87 BPM | HEIGHT: 61 IN | WEIGHT: 102.4 LBS | DIASTOLIC BLOOD PRESSURE: 60 MMHG | BODY MASS INDEX: 19.33 KG/M2 | TEMPERATURE: 97.8 F | SYSTOLIC BLOOD PRESSURE: 102 MMHG | OXYGEN SATURATION: 95 %

## 2025-08-08 DIAGNOSIS — R10.84 GENERALIZED ABDOMINAL PAIN: ICD-10-CM

## 2025-08-08 DIAGNOSIS — Z30.019 ENCOUNTER FOR INITIAL PRESCRIPTION OF CONTRACEPTIVES, UNSPECIFIED CONTRACEPTIVE: ICD-10-CM

## 2025-08-08 DIAGNOSIS — Z00.00 ANNUAL PHYSICAL EXAM: Primary | ICD-10-CM

## 2025-08-08 DIAGNOSIS — F41.8 DEPRESSION WITH ANXIETY: ICD-10-CM

## 2025-08-08 DIAGNOSIS — R63.4 WEIGHT LOSS, NON-INTENTIONAL: ICD-10-CM

## 2025-08-09 RX ORDER — PANTOPRAZOLE SODIUM 40 MG/1
40 TABLET, DELAYED RELEASE ORAL DAILY
Qty: 90 TABLET | Refills: 1 | Status: SHIPPED | OUTPATIENT
Start: 2025-08-09

## 2025-08-14 ENCOUNTER — TELEPHONE (OUTPATIENT)
Dept: GASTROENTEROLOGY | Facility: CLINIC | Age: 20
End: 2025-08-14
Payer: COMMERCIAL